# Patient Record
Sex: FEMALE | Race: BLACK OR AFRICAN AMERICAN | Employment: UNEMPLOYED | URBAN - METROPOLITAN AREA
[De-identification: names, ages, dates, MRNs, and addresses within clinical notes are randomized per-mention and may not be internally consistent; named-entity substitution may affect disease eponyms.]

---

## 2017-03-03 ENCOUNTER — OP HISTORICAL/CONVERTED ENCOUNTER (OUTPATIENT)
Dept: OTHER | Age: 62
End: 2017-03-03

## 2017-06-12 ENCOUNTER — OFFICE VISIT (OUTPATIENT)
Dept: ENDOCRINOLOGY | Age: 62
End: 2017-06-12

## 2017-06-12 ENCOUNTER — OP HISTORICAL/CONVERTED ENCOUNTER (OUTPATIENT)
Dept: OTHER | Age: 62
End: 2017-06-12

## 2017-06-12 VITALS
DIASTOLIC BLOOD PRESSURE: 79 MMHG | HEART RATE: 77 BPM | BODY MASS INDEX: 27.78 KG/M2 | HEIGHT: 67 IN | OXYGEN SATURATION: 97 % | TEMPERATURE: 98.1 F | SYSTOLIC BLOOD PRESSURE: 130 MMHG | WEIGHT: 177 LBS | RESPIRATION RATE: 20 BRPM

## 2017-06-12 DIAGNOSIS — E04.2 MULTINODULAR GOITER: Primary | ICD-10-CM

## 2017-06-12 NOTE — MR AVS SNAPSHOT
Visit Information Date & Time Provider Department Dept. Phone Encounter #  
 6/12/2017  1:00 PM Maryan Vincent MD [facility] Diabetes & Endocrinology [phone] [id number] Follow-up Instructions Return in about 6 months (around 12/12/2017). Upcoming Health Maintenance Date Due Hepatitis C Screening 1955 DTaP/Tdap/Td series (1 - Tdap) 4/22/1978 PAP AKA CERVICAL CYTOLOGY 4/22/1978 BREAST CANCER SCRN MAMMOGRAM 4/22/2007 FOBT Q 1 YEAR AGE 50-75 4/22/2007 ZOSTER VACCINE AGE 60> 4/22/2017 INFLUENZA AGE 9 TO ADULT 8/1/2017 Allergies as of 6/12/2017  Review Complete On: 6/12/2017 By: Maryan Vincent MD  
 Not on File Current Immunizations  Never Reviewed No immunizations on file. Not reviewed this visit You Were Diagnosed With   
  
 Codes Comments Multinodular goiter    -  Primary ICD-10-CM: E04.2 ICD-9-CM: 554. 1 Vitals BP Pulse Temp Resp Height(growth percentile) Weight(growth percentile) 130/79 77 98.1 °F (36.7 °C) (Oral) 20 5' 7\" (1.702 m) 177 lb (80.3 kg) SpO2 BMI OB Status Smoking Status 97% 27.72 kg/m2 Unknown Never Smoker BMI and BSA Data Body Mass Index Body Surface Area  
 27.72 kg/m 2 1.95 m 2 Your Updated Medication List  
  
   
This list is accurate as of: 6/12/17  1:26 PM.  Always use your most recent med list.  
  
  
  
  
 ATACAND 16 mg tablet Generic drug:  candesartan Take  by mouth daily. calcium 500 mg Tab Take  by mouth. Follow-up Instructions Return in about 6 months (around 12/12/2017). To-Do List   
 10/02/2017 Imaging:  US THYROID/PARATHYROID/SOFT TISS   
  
 11/12/2017 Lab:  T4, FREE   
  
 11/12/2017 Lab:  TSH 3RD GENERATION Introducing [facility]! Eduar Antunez introduces [employer] patient portal. Now you can access parts of your medical record, email your doctor's office, and request medication refills online. 1. In your internet browser, go to [email or web address]://[employer]. [employer]/[place] 2. Click on the First Time User? Click Here link in the Sign In box. You will see the New Member Sign Up page. 3. Enter your [employer] Access Code exactly as it appears below. You will not need to use this code after youve completed the sign-up process. If you do not sign up before the expiration date, you must request a new code. · [employer] Access Code: [email or web address] Expires: 9/10/2017  1:21 PM 
 
4. Enter the last four digits of your Social Security Number (xxxx) and Date of Birth (mm/dd/yyyy) as indicated and click Submit. You will be taken to the next sign-up page. 5. Create a Teepixt ID. This will be your [employer] login ID and cannot be changed, so think of one that is secure and easy to remember. 6. Create a [employer] password. You can change your password at any time. 7. Enter your Password Reset Question and Answer. This can be used at a later time if you forget your password. 8. Enter your e-mail address. You will receive e-mail notification when new information is available in [address] 9. Click Sign Up. You can now view and download portions of your medical record. 10. Click the Download Summary menu link to download a portable copy of your medical information. If you have questions, please visit the Frequently Asked Questions section of the [employer] website. Remember, [employer] is NOT to be used for urgent needs. For medical emergencies, dial 911. Now available from your iPhone and Android! Please provide this summary of care documentation to your next provider. Your primary care clinician is listed as [facility]. If you have any questions after today's visit, please call [phone].

## 2017-06-12 NOTE — PROGRESS NOTES
Wt Readings from Last 3 Encounters:   06/12/17 177 lb (80.3 kg)   12/12/16 186 lb (84.4 kg)   05/03/16 181 lb (82.1 kg)     Temp Readings from Last 3 Encounters:   06/12/17 98.1 °F (36.7 °C) (Oral)   12/12/16 97.6 °F (36.4 °C) (Oral)   05/03/16 97 °F (36.1 °C) (Oral)     BP Readings from Last 3 Encounters:   06/12/17 130/79   12/12/16 142/80   05/03/16 131/70     Pulse Readings from Last 3 Encounters:   06/12/17 77   12/12/16 83   05/03/16 77     Lab Results   Component Value Date/Time    Hemoglobin A1c, External 5.7 09/08/2015     Lab Results   Component Value Date/Time    TSH 0.335 12/28/2015 08:39 AM    T4, Free 0.98 12/28/2015 08:39 AM

## 2017-06-12 NOTE — PROGRESS NOTES
HISTORY OF PRESENT ILLNESS  Criss Harris is a 63 y.o. female. HPI  F/u after last  visit for thyroid disease from Dec 2016     Lost 9  lbs   She had done TLC     She is asymptomatic   She has NO chokiness         Prior history :   Referred for evaluation of Low TSH     No family history of thyroid disease   Denies any hyper or hypothyroid symptoms        Bilateral breast cancer - s/p  Lumpectomy 2008 and 2014   No anti-estrogen  hormonal treatments followed up   F/u with Dr. Bill Cody   Has alopecia and lost libido  She had hysterectomy and TLSO 2005       Past Medical History:   Diagnosis Date    Bilateral breast cancer ([address] 2014, 2008    lumpectomy    HTN (hypertension)        Social History     Social History    Marital status:      Spouse name: N/A    Number of children: N/A    Years of education: N/A     Occupational History    Not on file. Social History Main Topics    Smoking status: Never Smoker    Smokeless tobacco: Not on file    Alcohol use No    Drug use: No    Sexual activity: Not on file     Other Topics Concern    Not on file     Social History Narrative       Family History   Problem Relation Age of Onset    Cancer Mother     Stroke Maternal Uncle     Heart Disease Maternal Grandmother              Review of Systems   Constitutional: Negative. HENT: Negative. Eyes: Negative for pain and redness. Respiratory: Negative. Cardiovascular: Negative for chest pain, palpitations and leg swelling. Gastrointestinal: Negative. Negative for constipation. Genitourinary: Negative. Musculoskeletal: Negative for myalgias. Skin: Negative. Neurological: Negative. Endo/Heme/Allergies: Negative. Psychiatric/Behavioral: Negative for depression and memory loss. The patient does not have insomnia. Physical Exam   Constitutional: She is oriented to person, place, and time. She appears well-developed and well-nourished. HENT:   Head: Normocephalic.    Eyes: Conjunctivae and EOM are normal. Pupils are equal, round, and reactive to light. Neck: Normal range of motion. Neck supple. No JVD present. No tracheal deviation present. Thyromegaly (2-3 times enlarged) present. Cardiovascular: Normal rate, regular rhythm and normal heart sounds. No murmur heard. Pulmonary/Chest: Breath sounds normal.   Abdominal: Soft. Bowel sounds are normal.   Musculoskeletal: Normal range of motion. Lymphadenopathy:     She has no cervical adenopathy. Neurological: She is alert and oriented to person, place, and time. She has normal reflexes. Skin: Skin is warm. Psychiatric: She has a normal mood and affect.          Labs   TSH is   0.352   From    June 5 2017   TSh is 0.3 from dec 2015   And from oct 2016   In oct 2015 , was 0.2        ASSESSMENT and PLAN    Low TSH  :  MNG, toxic  Or from warm  thyroid nodule   explained to pt that she does not require treatment now, but there is a chance that this can get worse   She needs 6 monthly TFTs    Palpable goiter :  usg thyroid  From oct 2015   A nodule arising from right thyroid lobe extending to midline of 3.6 cm in size   This is a warm nodule - not active enough to use NICOLE therapy or give anti-thyroid drugs   S/p FNA in feb 2016 : both sites negative for cancer   F/u usg can be oct   2017     Bilateral breast cancer - s/p  Lumpectomy 2008 and 2014   She ended chemo radiation 2015   No anti-estrogen  hormonal treatments followed up   She has alopecia and less libido from being post menopausal     > 50 % of time is spent on counseling

## 2017-10-04 ENCOUNTER — HOSPITAL ENCOUNTER (OUTPATIENT)
Dept: ULTRASOUND IMAGING | Age: 62
Discharge: HOME | End: 2017-10-04
Attending: INTERNAL MEDICINE
Payer: OTHER GOVERNMENT

## 2017-10-04 DIAGNOSIS — E04.2 MULTINODULAR GOITER: ICD-10-CM

## 2017-10-04 PROCEDURE — 76536 US EXAM OF HEAD AND NECK: CPT

## 2017-12-04 ENCOUNTER — OFFICE VISIT (OUTPATIENT)
Dept: ENDOCRINOLOGY | Age: 62
End: 2017-12-04

## 2017-12-04 VITALS
HEIGHT: 67 IN | DIASTOLIC BLOOD PRESSURE: 90 MMHG | TEMPERATURE: 96.1 F | RESPIRATION RATE: 16 BRPM | SYSTOLIC BLOOD PRESSURE: 152 MMHG | BODY MASS INDEX: 27.69 KG/M2 | OXYGEN SATURATION: 95 % | HEART RATE: 78 BPM | WEIGHT: 176.4 LBS

## 2017-12-04 DIAGNOSIS — E04.2 MULTINODULAR GOITER: Primary | ICD-10-CM

## 2017-12-04 NOTE — PROGRESS NOTES
HISTORY OF PRESENT ILLNESS  Mojgan Ortiz is a 63 y.o. female. HPI  F/u after last  visit for thyroid disease from June 12 2017     Lost  ONE  lb   She had done TLC     She is asymptomatic   Had labs and usg   She has NO chokiness         Prior history :   Referred for evaluation of Low TSH     No family history of thyroid disease   Denies any hyper or hypothyroid symptoms        Bilateral breast cancer - s/p  Lumpectomy 2008 and 2014   No anti-estrogen  hormonal treatments followed up   F/u with Dr. Sonal Olson   Has alopecia and lost libido  She had hysterectomy and TLSO 2005       Past Medical History:   Diagnosis Date    Bilateral breast cancer ([facility] [address] 2014, 2008    lumpectomy    HTN (hypertension)        Social History     Social History    Marital status:      Spouse name: N/A    Number of children: N/A    Years of education: N/A     Occupational History    Not on file. Social History Main Topics    Smoking status: Never Smoker    Smokeless tobacco: Never Used    Alcohol use No    Drug use: No    Sexual activity: Not on file     Other Topics Concern    Not on file     Social History Narrative       Family History   Problem Relation Age of Onset    Cancer Mother     Stroke Maternal Uncle     Heart Disease Maternal Grandmother              Review of Systems   Constitutional: Negative. HENT: Negative. Eyes: Negative for pain and redness. Respiratory: Negative. Cardiovascular: Negative for chest pain, palpitations and leg swelling. Gastrointestinal: Negative. Negative for constipation. Genitourinary: Negative. Musculoskeletal: Negative for myalgias. Skin: Negative. Neurological: Negative. Endo/Heme/Allergies: Negative. Psychiatric/Behavioral: Negative for depression and memory loss. The patient does not have insomnia. Physical Exam   Constitutional: She is oriented to person, place, and time. She appears well-developed and well-nourished.    HENT:   Head: Normocephalic. Eyes: Conjunctivae and EOM are normal. Pupils are equal, round, and reactive to light. Neck: Normal range of motion. Neck supple. No JVD present. No tracheal deviation present. Thyromegaly (2-3 times enlarged) present. Cardiovascular: Normal rate, regular rhythm and normal heart sounds. No murmur heard. Pulmonary/Chest: Breath sounds normal.   Abdominal: Soft. Bowel sounds are normal.   Musculoskeletal: Normal range of motion. Lymphadenopathy:     She has no cervical adenopathy. Neurological: She is alert and oriented to person, place, and time. She has normal reflexes. Skin: Skin is warm. Psychiatric: She has a normal mood and affect. Labs :    Skipper [place]ocket is normal from nov 2017  TSH is   0.352   From    June 5 2017   TSh is 0.3 from dec 2015   And from oct 2016   In oct 2015 , was 0.2        ASSESSMENT and PLAN    Low TSH  :  MNG, toxic  Or from warm  thyroid nodule   explained to pt that she does not require treatment now, but there is a chance that this can get worse   She needs 6 monthly TFTs    Palpable goiter :  usg thyroid  From oct 2015   A nodule arising from right thyroid lobe extending to midline of 3.6 cm in size  ( could be wrongly dictated by the radiologist - will confirm with him)   This is a warm nodule - not active enough to use NICOLE therapy or give anti-thyroid drugs   S/p FNA in feb 2016 : both sites ( Isthmic and left lobe )  negative for cancer   USG  oct   2017 : There is a dominant 3.7 x 2.6 x 2.0 cm mixed solid/cystic nodule at the lower pole of the left thyroid lobe, unchanged in size when compared to the prior study. Multiple smaller nodules are also seen, including a 7 mm hypoechoic nodule in the right  isthmus, a 1.5 cm mixed solid/cystic nodule in the midportion of the left lobe, and several subcentimeter hypoechoic left lobe nodules.     She is euthyroid and asymptomatic    Bilateral breast cancer - s/p  Lumpectomy 2008 and 2014   She ended chemo radiation 2015   No anti-estrogen  hormonal treatments followed up   She has alopecia and less libido from being post menopausal       F/u in a year with labs bnv    > 50 % of time is spent on counseling   Patient voiced understanding her plan of care

## 2017-12-04 NOTE — PROGRESS NOTES
Chief Complaint   Patient presents with    Thyroid Problem     1. Have you been to the ER, urgent care clinic since your last visit? Hospitalized since your last visit? No    2. Have you seen or consulted any other health care providers outside of the [address] [place] [address] since your last visit? Include any pap smears or colon screening.  No     Wt Readings from Last 3 Encounters:   12/04/17 176 lb 6.4 oz (80 kg)   06/12/17 177 lb (80.3 kg)   12/12/16 186 lb (84.4 kg)     Temp Readings from Last 3 Encounters:   12/04/17 96.1 °F (35.6 °C) (Oral)   06/12/17 98.1 °F (36.7 °C) (Oral)   12/12/16 97.6 °F (36.4 °C) (Oral)     BP Readings from Last 3 Encounters:   12/04/17 152/90   06/12/17 130/79   12/12/16 142/80     Pulse Readings from Last 3 Encounters:   12/04/17 78   06/12/17 77   12/12/16 83

## 2017-12-04 NOTE — MR AVS SNAPSHOT
Visit Information Date & Time Provider Department Dept. Phone Encounter #  
 12/4/2017 10:30 AM Reggie Kay MD [facility] [phone] [id number] Follow-up Instructions Return in about 1 year (around 12/4/2018). Upcoming Health Maintenance Date Due Hepatitis C Screening 1955 DTaP/Tdap/Td series (1 - Tdap) 4/22/1978 PAP AKA CERVICAL CYTOLOGY 4/22/1978 BREAST CANCER SCRN MAMMOGRAM 4/22/2007 FOBT Q 1 YEAR AGE 50-75 4/22/2007 ZOSTER VACCINE AGE 60> 2/22/2017 Influenza Age 5 to Adult 8/1/2017 Allergies as of 12/4/2017  Review Complete On: 12/4/2017 By: Reggie Kay MD  
 No Known Allergies Current Immunizations  Never Reviewed No immunizations on file. Not reviewed this visit You Were Diagnosed With   
  
 Codes Comments Multinodular goiter    -  Primary ICD-10-CM: E04.2 ICD-9-CM: 564. 1 Vitals BP Pulse Temp Resp Height(growth percentile) Weight(growth percentile) 152/90 (BP 1 Location: Left arm, BP Patient Position: Sitting) 78 96.1 °F (35.6 °C) (Oral) 16 5' 7\" (1.702 m) 176 lb 6.4 oz (80 kg) SpO2 BMI OB Status Smoking Status 95% 27.63 kg/m2 Unknown Never Smoker BMI and BSA Data Body Mass Index Body Surface Area  
 27.63 kg/m 2 1.94 m 2 Preferred Pharmacy Pharmacy Name Phone [facility] [address] [phone] Your Updated Medication List  
  
   
This list is accurate as of: 12/4/17 11:01 AM.  Always use your most recent med list.  
  
  
  
  
 ATACAND 16 mg tablet Generic drug:  candesartan Take  by mouth daily. calcium 500 mg Tab Take  by mouth. Follow-up Instructions Return in about 1 year (around 12/4/2018). Please provide this summary of care documentation to your next provider. Your primary care clinician is listed as Rhoder[facility] [place].  If you have any questions after today's visit, please call [phone].

## 2017-12-05 ENCOUNTER — TELEPHONE (OUTPATIENT)
Dept: ENDOCRINOLOGY | Age: 62
End: 2017-12-05

## 2017-12-05 NOTE — TELEPHONE ENCOUNTER
Informed patient lab results were normal per . Patient verbalized understanding. No further questions noted at this time.

## 2017-12-17 ENCOUNTER — DOCUMENTATION ONLY (OUTPATIENT)
Dept: ENDOCRINOLOGY | Age: 62
End: 2017-12-17

## 2018-03-05 ENCOUNTER — OP HISTORICAL/CONVERTED ENCOUNTER (OUTPATIENT)
Dept: OTHER | Age: 63
End: 2018-03-05

## 2018-04-16 ENCOUNTER — OP HISTORICAL/CONVERTED ENCOUNTER (OUTPATIENT)
Dept: OTHER | Age: 63
End: 2018-04-16

## 2018-11-27 LAB
T4 FREE SERPL-MCNC: 1.15 NG/DL (ref 0.82–1.77)
TSH SERPL DL<=0.005 MIU/L-ACNC: 0.2 UIU/ML (ref 0.45–4.5)

## 2018-12-03 ENCOUNTER — OFFICE VISIT (OUTPATIENT)
Dept: ENDOCRINOLOGY | Age: 63
End: 2018-12-03

## 2018-12-03 VITALS
WEIGHT: 184 LBS | DIASTOLIC BLOOD PRESSURE: 97 MMHG | RESPIRATION RATE: 16 BRPM | SYSTOLIC BLOOD PRESSURE: 171 MMHG | HEART RATE: 73 BPM | BODY MASS INDEX: 28.88 KG/M2 | HEIGHT: 67 IN | OXYGEN SATURATION: 96 %

## 2018-12-03 DIAGNOSIS — R79.89 LOW TSH LEVEL: ICD-10-CM

## 2018-12-03 DIAGNOSIS — I10 ESSENTIAL HYPERTENSION: ICD-10-CM

## 2018-12-03 DIAGNOSIS — E05.20 TOXIC NODULAR GOITER: Primary | ICD-10-CM

## 2018-12-03 NOTE — PROGRESS NOTES
HISTORY OF PRESENT ILLNESS  Elisabet Garcia is a 66 y.o. female. HPI  F/u after last  visit for thyroid disease from December 4 2017     Lost  ONE  lb   She is asymptomatic           Prior history :   Referred for evaluation of Low TSH     No family history of thyroid disease   Denies any hyper or hypothyroid symptoms        Bilateral breast cancer - s/p  Lumpectomy 2008 and 2014   No anti-estrogen  hormonal treatments followed up   F/u with Dr. Edward Borden   Has alopecia and lost libido  She had hysterectomy and TLSO 2005       Past Medical History:   Diagnosis Date    Bilateral breast cancer ([facility] [address] 2014, 2008    lumpectomy    HTN (hypertension)        Social History     Socioeconomic History    Marital status:      Spouse name: Not on file    Number of children: Not on file    Years of education: Not on file    Highest education level: Not on file   Social Needs    Financial resource strain: Not on file    Food insecurity - worry: Not on file    Food insecurity - inability: Not on file   [employer] needs - medical: Not on file   [employer] needs - non-medical: Not on file   Occupational History    Not on file   Tobacco Use    Smoking status: Never Smoker    Smokeless tobacco: Never Used   Substance and Sexual Activity    Alcohol use: No     Alcohol/week: 0.0 oz    Drug use: No    Sexual activity: Not on file   Other Topics Concern    Not on file   Social History Narrative    Not on file       Family History   Problem Relation Age of Onset    Cancer Mother     Stroke Maternal Uncle     Heart Disease Maternal Grandmother              Review of Systems   Constitutional: Negative. HENT: Negative. Eyes: Negative for pain and redness. Respiratory: Negative. Cardiovascular: Negative for chest pain, palpitations and leg swelling. Gastrointestinal: Negative. Negative for constipation. Genitourinary: Negative. Musculoskeletal: Negative for myalgias. Skin: Negative. Neurological: Negative. Endo/Heme/Allergies: Negative. Psychiatric/Behavioral: Negative for depression and memory loss. The patient does not have insomnia. Physical Exam   Constitutional: She is oriented to person, place, and time. She appears well-developed and well-nourished. HENT:   Head: Normocephalic. Eyes: Conjunctivae and EOM are normal. Pupils are equal, round, and reactive to light. Neck: Normal range of motion. Neck supple. No JVD present. No tracheal deviation present. Thyromegaly (2-3 times enlarged) present. Cardiovascular: Normal rate, regular rhythm and normal heart sounds. No murmur heard. Pulmonary/Chest: Breath sounds normal.   Abdominal: Soft. Bowel sounds are normal.   Musculoskeletal: Normal range of motion. Lymphadenopathy:     She has no cervical adenopathy. Neurological: She is alert and oriented to person, place, and time. She has normal reflexes. Skin: Skin is warm. Psychiatric: She has a normal mood and affect. Labs :  Lab Results   Component Value Date/Time    TSH 0.199 (L) 11/26/2018 11:43 AM    T4, Free 1.15 11/26/2018 11:43 AM      TSh is normal from nov 2017  TSH is   0.352   From    June 5 2017   TSh is 0.3 from dec 2015   And from oct 2016   In oct 2015 , was 0.2        ASSESSMENT and PLAN    Low TSH  :  MNG, toxic  Or from warm  thyroid nodule  explained to pt that she does not require treatment now, but there is a chance that this can get worse   This is a warm nodule - not active enough to use NICOLE therapy or give anti-thyroid drugs       Palpable goiter :  usg thyroid  From oct 2015   A nodule arising from right thyroid lobe extending to midline of 3.6 cm in size  ( could be wrongly dictated by the radiologist - will confirm with him)   This is a warm nodule - not active enough to use NICOLE therapy or give anti-thyroid drugs   S/p FNA in feb 2016 : both sites ( Isthmic and left lobe )  negative for cancer   USG  oct   2017 :   There is a dominant 3.7 x 2.6 x 2.0 cm mixed solid/cystic nodule at the lower pole of the left thyroid lobe, unchanged in size when compared to the prior study. Multiple smaller nodules are also seen, including a 7 mm hypoechoic nodule in the right  isthmus, a 1.5 cm mixed solid/cystic nodule in the midportion of the left lobe, and several subcentimeter hypoechoic left lobe nodules.   She is euthyroid and asymptomatic        HTN :  Very uncontrolled   atacand  16 mg a day   F/u with pcp       Bilateral breast cancer - s/p  Lumpectomy 2008 and 2014   She ended chemo radiation 2015   No anti-estrogen  hormonal treatments followed up   She has alopecia and less libido from being post menopausal       F/u in a year with labs bnv    > 50 % of time is spent on counseling   Patient voiced understanding her plan of care

## 2018-12-03 NOTE — PROGRESS NOTES
Conrado Stanley is a 66 y.o. female      Chief Complaint   Patient presents with    Follow-up    Thyroid Problem       1. Have you been to the ER, urgent care clinic since your last visit? Hospitalized since your last visit? No    2. Have you seen or consulted any other health care providers outside of the [address] [place] [address] since your last visit? Include any pap smears or colon screening.  No     Wt Readings from Last 3 Encounters:   12/03/18 184 lb (83.5 kg)   12/04/17 176 lb 6.4 oz (80 kg)   06/12/17 177 lb (80.3 kg)     Temp Readings from Last 3 Encounters:   12/04/17 96.1 °F (35.6 °C) (Oral)   06/12/17 98.1 °F (36.7 °C) (Oral)   12/12/16 97.6 °F (36.4 °C) (Oral)     BP Readings from Last 3 Encounters:   12/03/18 (!) 171/97   12/04/17 152/90   06/12/17 130/79     Pulse Readings from Last 3 Encounters:   12/03/18 73   12/04/17 78   06/12/17 77

## 2019-03-15 ENCOUNTER — OP HISTORICAL/CONVERTED ENCOUNTER (OUTPATIENT)
Dept: OTHER | Age: 64
End: 2019-03-15

## 2019-03-22 ENCOUNTER — OP HISTORICAL/CONVERTED ENCOUNTER (OUTPATIENT)
Dept: OTHER | Age: 64
End: 2019-03-22

## 2019-09-10 ENCOUNTER — TELEPHONE (OUTPATIENT)
Dept: ENDOCRINOLOGY | Age: 64
End: 2019-09-10

## 2019-09-10 NOTE — TELEPHONE ENCOUNTER
----- Message from Kasie Abbott sent at 9/9/2019  5:36 PM EDT -----  Regarding: Estela Funk/telephone  Pt has a question in reference to a bill that she has received but said she has paid.  Best contact number is [phone]

## 2019-11-20 ENCOUNTER — HOSPITAL ENCOUNTER (OUTPATIENT)
Dept: LAB | Age: 64
Discharge: HOME | End: 2019-11-20

## 2019-11-20 ENCOUNTER — LAB ONLY (OUTPATIENT)
Dept: ENDOCRINOLOGY | Age: 64
End: 2019-11-20

## 2019-11-20 DIAGNOSIS — R79.89 LOW TSH LEVEL: ICD-10-CM

## 2019-11-20 DIAGNOSIS — E05.20 TOXIC NODULAR GOITER: ICD-10-CM

## 2019-11-20 DIAGNOSIS — E05.20 TOXIC NODULAR GOITER: Primary | ICD-10-CM

## 2019-11-21 LAB
T4 FREE SERPL-MCNC: 0.9 NG/DL (ref 0.8–1.5)
TSH SERPL DL<=0.05 MIU/L-ACNC: 0.19 UIU/ML (ref 0.36–3.74)

## 2019-12-04 ENCOUNTER — OFFICE VISIT (OUTPATIENT)
Dept: ENDOCRINOLOGY | Age: 64
End: 2019-12-04

## 2019-12-04 VITALS
HEART RATE: 82 BPM | WEIGHT: 187.6 LBS | HEIGHT: 67 IN | TEMPERATURE: 96.9 F | OXYGEN SATURATION: 98 % | BODY MASS INDEX: 29.44 KG/M2 | RESPIRATION RATE: 18 BRPM | SYSTOLIC BLOOD PRESSURE: 134 MMHG | DIASTOLIC BLOOD PRESSURE: 75 MMHG

## 2019-12-04 DIAGNOSIS — E05.20 TOXIC NODULAR GOITER: ICD-10-CM

## 2019-12-04 DIAGNOSIS — R79.89 LOW TSH LEVEL: Primary | ICD-10-CM

## 2019-12-04 RX ORDER — CANDESARTAN CILEXETIL AND HYDROCHLOROTHIAZIDE 16; 12.5 MG/1; MG/1
1 TABLET ORAL DAILY
COMMUNITY
Start: 2019-09-17

## 2019-12-04 NOTE — PROGRESS NOTES
1. Have you been to the ER, urgent care clinic since your last visit? No  Hospitalized since your last visit? No    2. Have you seen or consulted any other health care providers outside of the [address] [place] [address] since your last visit? Include any pap smears or colon screening.  No    Wt Readings from Last 3 Encounters:   12/04/19 187 lb 9.6 oz (85.1 kg)   12/03/18 184 lb (83.5 kg)   12/04/17 176 lb 6.4 oz (80 kg)     Temp Readings from Last 3 Encounters:   12/04/19 96.9 °F (36.1 °C) (Oral)   12/04/17 96.1 °F (35.6 °C) (Oral)   06/12/17 98.1 °F (36.7 °C) (Oral)     BP Readings from Last 3 Encounters:   12/04/19 134/75   12/03/18 (!) 171/97   12/04/17 152/90     Pulse Readings from Last 3 Encounters:   12/04/19 82   12/03/18 73   12/04/17 78

## 2019-12-04 NOTE — PROGRESS NOTES
HISTORY OF PRESENT ILLNESS  Edson Crandall is a 60 y.o. female.   HPI  Yearly follow up  after last  visit for low TSH and MNG  from December 4 2017     Lost  ONE  lb   She is asymptomatic   Denies any hyperthyroid symptoms           Prior history :   Referred for evaluation of Low TSH     No family history of thyroid disease   Denies any hyper or hypothyroid symptoms        Bilateral breast cancer - s/p  Lumpectomy 2008 and 2014   No anti-estrogen  hormonal treatments followed up   F/u with Dr. Meghana Ruth   Has alopecia and lost libido  She had hysterectomy and TLSO 2005       Past Medical History:   Diagnosis Date    Bilateral breast cancer ([facility] [address] 2014, 2008    lumpectomy    HTN (hypertension)        Social History     Socioeconomic History    Marital status:      Spouse name: Not on file    Number of children: Not on file    Years of education: Not on file    Highest education level: Not on file   Occupational History    Not on file   Social Needs    Financial resource strain: Not on file    Food insecurity:     Worry: Not on file     Inability: Not on file    Transportation needs:     Medical: Not on file     Non-medical: Not on file   Tobacco Use    Smoking status: Never Smoker    Smokeless tobacco: Never Used   Substance and Sexual Activity    Alcohol use: No     Alcohol/week: 0.0 standard drinks    Drug use: No    Sexual activity: Not on file   Lifestyle    Physical activity:     Days per week: Not on file     Minutes per session: Not on file    Stress: Not on file   Relationships    Social connections:     Talks on phone: Not on file     Gets together: Not on file     Attends Congregational service: Not on file     Active member of club or organization: Not on file     Attends meetings of clubs or organizations: Not on file     Relationship status: Not on file    Intimate partner violence:     Fear of current or ex partner: Not on file     Emotionally abused: Not on file     Physically abused: Not on file     Forced sexual activity: Not on file   Other Topics Concern    Not on file   Social History Narrative    Not on file       Family History   Problem Relation Age of Onset    Cancer Mother     Stroke Maternal Uncle     Heart Disease Maternal Grandmother              Review of Systems   Constitutional: Negative. HENT: Negative. Eyes: Negative for pain and redness. Respiratory: Negative. Cardiovascular: Negative for chest pain, palpitations and leg swelling. Gastrointestinal: Negative. Negative for constipation. Genitourinary: Negative. Musculoskeletal: Negative for myalgias. Skin: Negative. Neurological: Negative. Endo/Heme/Allergies: Negative. Psychiatric/Behavioral: Negative for depression and memory loss. The patient does not have insomnia. Physical Exam   Constitutional: She is oriented to person, place, and time. She appears well-developed and well-nourished. HENT:   Head: Normocephalic. Eyes: Conjunctivae and EOM are normal. Pupils are equal, round, and reactive to light. Neck: Normal range of motion. Neck supple. No JVD present. No tracheal deviation present. Thyromegaly (2-3 times enlarged) present. Cardiovascular: Normal rate, regular rhythm and normal heart sounds. No murmur heard. Pulmonary/Chest: Breath sounds normal.   Abdominal: Soft. Bowel sounds are normal.   Musculoskeletal: Normal range of motion. Lymphadenopathy:     She has no cervical adenopathy. Neurological: She is alert and oriented to person, place, and time. She has normal reflexes. Skin: Skin is warm. Psychiatric: She has a normal mood and affect.          Labs :  Lab Results   Component Value Date/Time    TSH 0.19 (L) 11/20/2019 09:26 AM    T4, Free 0.9 11/20/2019 09:26 AM      TSh is normal from nov 2017  TSH is   0.352   From    June 5 2017   TSh is 0.3 from dec 2015   And from oct 2016   In oct 2015 , was 0.2        ASSESSMENT and PLAN    Low TSH  :  MNG, toxic  Or from warm  thyroid nodule  explained to pt that she does not require treatment now, but there is a chance that this can get worse   This is a warm nodule - not active enough to use NICOLE therapy or give anti-thyroid drugs       Palpable goiter :    usg thyroid  From oct 2015   A nodule arising from right thyroid lobe extending to midline of 3.6 cm in size  ( could be wrongly dictated by the radiologist - will confirm with him)   This is a warm nodule - not active enough to use NICOLE therapy or give anti-thyroid drugs   S/p FNA in feb 2016 : both sites ( Isthmic and left lobe )  negative for cancer       USG  oct   2017 : There is a dominant 3.7 x 2.6 x 2.0 cm mixed solid/cystic nodule at the lower pole of the left thyroid lobe, unchanged in size when compared to the prior study. Multiple smaller nodules are also seen, including a 7 mm hypoechoic nodule in the right isthmus, a 1.5 cm mixed solid/cystic nodule in the midportion of the left lobe, and several subcentimeter hypoechoic left lobe nodules.   She is euthyroid and asymptomatic  Ordering usg and thyroid scan as her recent TSH is 0.1        HTN :  Better controlled   atacand- hctz   16-12.5 mg a day       Bilateral breast cancer - s/p  Lumpectomy 2008 and 2014   She ended chemo radiation 2015   No anti-estrogen  hormonal treatments followed up   She has alopecia and less libido from being post menopausal       F/u in 6 months with usg and scan   > 50 % of time is spent on counseling   Patient voiced understanding her plan of care

## 2019-12-04 NOTE — LETTER
12/4/19 Patient: Aleah Melendez YOB: 1955 Date of Visit: 12/4/2019 Sarah Beth Rubi MD 
[place] [address] [address] [facility] [address] VIA Facsimile: [phone] Dear Sarah Beth Rubi MD, Thank you for referring Ms. Taylor Milian to [address] [place] [address] for evaluation. My notes for this consultation are attached. If you have questions, please do not hesitate to call me. I look forward to following your patient along with you. Sincerely, Esvin Gupta MD

## 2020-01-08 ENCOUNTER — HOSPITAL ENCOUNTER (OUTPATIENT)
Dept: NUCLEAR MEDICINE | Age: 65
Discharge: HOME | End: 2020-01-08
Attending: INTERNAL MEDICINE
Payer: OTHER GOVERNMENT

## 2020-01-08 DIAGNOSIS — E05.20 TOXIC NODULAR GOITER: ICD-10-CM

## 2020-01-08 DIAGNOSIS — R79.89 LOW TSH LEVEL: ICD-10-CM

## 2020-01-08 PROCEDURE — 78014 THYROID IMAGING W/BLOOD FLOW: CPT

## 2020-01-09 ENCOUNTER — HOSPITAL ENCOUNTER (OUTPATIENT)
Dept: ULTRASOUND IMAGING | Age: 65
Discharge: HOME | End: 2020-01-09
Attending: INTERNAL MEDICINE
Payer: OTHER GOVERNMENT

## 2020-01-09 ENCOUNTER — HOSPITAL ENCOUNTER (OUTPATIENT)
Dept: NUCLEAR MEDICINE | Age: 65
Discharge: HOME | End: 2020-01-09
Attending: INTERNAL MEDICINE
Payer: OTHER GOVERNMENT

## 2020-01-09 DIAGNOSIS — R79.89 LOW TSH LEVEL: ICD-10-CM

## 2020-01-09 DIAGNOSIS — E05.20 TOXIC NODULAR GOITER: ICD-10-CM

## 2020-01-09 PROCEDURE — 76536 US EXAM OF HEAD AND NECK: CPT

## 2020-02-21 ENCOUNTER — OP HISTORICAL/CONVERTED ENCOUNTER (OUTPATIENT)
Dept: OTHER | Age: 65
End: 2020-02-21

## 2020-03-16 ENCOUNTER — OP HISTORICAL/CONVERTED ENCOUNTER (OUTPATIENT)
Dept: OTHER | Age: 65
End: 2020-03-16

## 2020-04-21 ENCOUNTER — TELEPHONE (OUTPATIENT)
Dept: ENDOCRINOLOGY | Age: 65
End: 2020-04-21

## 2020-04-21 DIAGNOSIS — E04.2 MULTINODULAR GOITER: Primary | ICD-10-CM

## 2020-04-21 NOTE — TELEPHONE ENCOUNTER
Order placed for pt per verbal order with read back from Dr. Walter West 04/21/20    Lab slips mailed

## 2020-04-29 LAB — CREATININE, EXTERNAL: 0.77

## 2020-05-29 LAB
T4 FREE SERPL-MCNC: 1.08 NG/DL (ref 0.82–1.77)
TSH SERPL DL<=0.005 MIU/L-ACNC: 0.23 UIU/ML (ref 0.45–4.5)

## 2020-06-04 ENCOUNTER — VIRTUAL VISIT (OUTPATIENT)
Dept: ENDOCRINOLOGY | Age: 65
End: 2020-06-04

## 2020-06-04 DIAGNOSIS — C50.912 BILATERAL MALIGNANT NEOPLASM OF BREAST IN FEMALE, UNSPECIFIED ESTROGEN RECEPTOR STATUS, UNSPECIFIED SITE OF BREAST (HCC): ICD-10-CM

## 2020-06-04 DIAGNOSIS — E05.20 TOXIC NODULAR GOITER: Primary | ICD-10-CM

## 2020-06-04 DIAGNOSIS — E04.2 MULTIPLE THYROID NODULES: ICD-10-CM

## 2020-06-04 DIAGNOSIS — C50.911 BILATERAL MALIGNANT NEOPLASM OF BREAST IN FEMALE, UNSPECIFIED ESTROGEN RECEPTOR STATUS, UNSPECIFIED SITE OF BREAST (HCC): ICD-10-CM

## 2020-06-04 NOTE — PROGRESS NOTES
1. Have you been to the ER, urgent care clinic since your last visit? No Hospitalized since your last visit? No    2. Have you seen or consulted any other health care providers outside of the [address] [place] [address] since your last visit? Include any pap smears or colon screening.  No

## 2020-06-04 NOTE — PROGRESS NOTES
**THIS IS A VIRTUAL VISIT VIA AUDIO- VIDEO SYNCHRONOUS DISCUSSION. PATIENT AGREED TO HAVE THEIR CARE DELIVERED OVER A [employer] ME VIDEO VISIT IN PLACE OF THEIR REGULARLY SCHEDULED OFFICE VISIT**   Pt  is aware that this is a billable encounter and is responsible for copays/deductibles   Patient gave a verbal consent to proceed with virtual video visit   Patient is at home and I, the provider,  am at the office care diabetes and endocrinology      HISTORY OF PRESENT ILLNESS  Fidelia Umaña is a 63 y.o. female.   HPI  Yearly follow up  after last  visit for low TSH and MNG  from December 4 2019    She is c/o fluctuation in her mood, weight and she is thinking thyroid is the cause for it   She has no other symtptoms      Old history      Lost  ONE  lb   She is asymptomatic   Denies any hyperthyroid symptoms           Prior history :   Referred for evaluation of Low TSH     No family history of thyroid disease   Denies any hyper or hypothyroid symptoms        Bilateral breast cancer - s/p  Lumpectomy 2008 and 2014   No anti-estrogen  hormonal treatments followed up   F/u with Dr. Ioana Rodriguez   Has alopecia and lost libido  She had hysterectomy and TLSO 2005       Past Medical History:   Diagnosis Date    Bilateral breast cancer ([facility] [address] 2014, 2008    lumpectomy    HTN (hypertension)        Social History     Socioeconomic History    Marital status:      Spouse name: Not on file    Number of children: Not on file    Years of education: Not on file    Highest education level: Not on file   Occupational History    Not on file   Social Needs    Financial resource strain: Not on file    Food insecurity     Worry: Not on file     Inability: Not on file    Transportation needs     Medical: Not on file     Non-medical: Not on file   Tobacco Use    Smoking status: Never Smoker    Smokeless tobacco: Never Used   Substance and Sexual Activity    Alcohol use: No     Alcohol/week: 0.0 standard drinks    Drug use: No    Sexual activity: Not on file   Lifestyle    Physical activity     Days per week: Not on file     Minutes per session: Not on file    Stress: Not on file   Relationships    Social connections     Talks on phone: Not on file     Gets together: Not on file     Attends Mormonism service: Not on file     Active member of club or organization: Not on file     Attends meetings of clubs or organizations: Not on file     Relationship status: Not on file    Intimate partner violence     Fear of current or ex partner: Not on file     Emotionally abused: Not on file     Physically abused: Not on file     Forced sexual activity: Not on file   Other Topics Concern    Not on file   Social History Narrative    Not on file       Family History   Problem Relation Age of Onset    Cancer Mother     Stroke Maternal Uncle     Heart Disease Maternal Grandmother                        Review of Systems   Constitutional: Negative. HENT: Negative. Eyes: Negative for pain and redness. Respiratory: Negative. Cardiovascular: Negative for chest pain, palpitations and leg swelling. Gastrointestinal: Negative. Negative for constipation. Genitourinary: Negative. Musculoskeletal: Negative for myalgias. Skin: Negative. Neurological: Negative. Endo/Heme/Allergies: Negative. Psychiatric/Behavioral: Negative for depression and memory loss. The patient does not have insomnia. Physical Exam   Constitutional: oriented to person, place, and time. appears well-developed and well-nourished. HENT:   Head: Normocephalic. Eyes: normal , noted no swelling or redness. Neck: Normal range of motion. Cardiovascular: could nto be examined  Pulmonary/Chest: appears breathing effortlessly  Abdominal: Soft. Musculoskeletal: appears relatively nprmal  range of motion. Neurological: He is alert and oriented to person, place, and time.  Appears to have no focal deficits    Psychiatric: He has a normal mood and affect. Labs :  Lab Results   Component Value Date/Time    TSH 0.225 (L) 05/28/2020 10:06 AM    T4, Free 1.08 05/28/2020 10:06 AM      TSh is normal from nov 2017  TSH is   0.352   From    June 5 2017   TSh is 0.3 from dec 2015   And from oct 2016   In oct 2015 , was 0.2        ASSESSMENT and PLAN    Low TSH  :  Her TSH has been the same for 2.5 years  0.2   MNG, toxic  Or from warm  thyroid nodule  explained to pt that she does not require treatment now, but there is a chance that this can get worse   This is a warm nodule - not active enough to use NICOLE therapy or give anti-thyroid drugs       Palpable goiter :    usg thyroid  From oct 2015   A nodule arising from right thyroid lobe extending to midline of 3.6 cm in size  ( could be wrongly dictated by the radiologist - will confirm with him)   This is a warm nodule - not active enough to use NICOLE therapy or give anti-thyroid drugs   S/p FNA in feb 2016 : both sites ( Isthmic and left lobe )  negative for cancer       USG  oct   2017 : There is a dominant 3.7 x 2.6 x 2.0 cm mixed solid/cystic nodule at the lower pole of the left thyroid lobe, unchanged in size when compared to the prior study. Multiple smaller nodules are also seen, including a 7 mm hypoechoic nodule in the right isthmus, a 1.5 cm mixed solid/cystic nodule in the midportion of the left lobe, and several subcentimeter hypoechoic left lobe nodules.   She is euthyroid and asymptomatic  Ordered usg and thyroid scan   In dec 2019   as her recent TSH is 0.1:  Found to have warm nodule on left side mid polar area   usg revealed right lobe bigger than left lobe   Right side has larger nodule of more than 3 cm size   Left side there are 2 nodules - of 1.5 cm and 1.5 cm size      FNA pending  -  Asymptomatic  And  Euthyroid   usg with bilateral biospy possibly in 6 months         HTN :  Better controlled   atacand- hctz   16-12.5 mg a day       Bilateral breast cancer - s/p  Lumpectomy 2008 and 2014   She ended chemo radiation 2015   No anti-estrogen  hormonal treatments followed up   She has alopecia and less libido from being post menopausal       F/u in 6 months  For usg and bilat biopsy     Pursuant  To the Emergency declaration under the [employer] and the [facility], [facility] waiver authority and the [facility], to reduce the patient's risk of exposure to  COVID-19 and provide continuity of care for an established patient.

## 2020-10-02 ENCOUNTER — TRANSCRIBE ORDER (OUTPATIENT)
Dept: SCHEDULING | Age: 65
End: 2020-10-02

## 2020-10-02 DIAGNOSIS — C50.911 MALIGNANT NEOPLASM OF RIGHT BREAST (HCC): Primary | ICD-10-CM

## 2020-10-07 ENCOUNTER — HOSPITAL ENCOUNTER (OUTPATIENT)
Dept: CT IMAGING | Age: 65
Discharge: HOME | End: 2020-10-07
Attending: INTERNAL MEDICINE
Payer: OTHER GOVERNMENT

## 2020-10-07 DIAGNOSIS — C50.911 MALIGNANT NEOPLASM OF RIGHT BREAST (HCC): ICD-10-CM

## 2020-10-07 PROCEDURE — 74011000636 HC RX REV CODE- 636: Performed by: INTERNAL MEDICINE

## 2020-10-07 PROCEDURE — 71260 CT THORAX DX C+: CPT

## 2020-10-07 RX ADMIN — IOPAMIDOL 100 ML: 755 INJECTION, SOLUTION INTRAVENOUS at 10:15

## 2020-11-18 ENCOUNTER — OFFICE VISIT (OUTPATIENT)
Dept: ENDOCRINOLOGY | Age: 65
End: 2020-11-18
Payer: OTHER GOVERNMENT

## 2020-11-18 VITALS
HEART RATE: 79 BPM | OXYGEN SATURATION: 99 % | HEIGHT: 67 IN | TEMPERATURE: 97.5 F | BODY MASS INDEX: 28.56 KG/M2 | SYSTOLIC BLOOD PRESSURE: 136 MMHG | DIASTOLIC BLOOD PRESSURE: 70 MMHG | RESPIRATION RATE: 16 BRPM | WEIGHT: 182 LBS

## 2020-11-18 DIAGNOSIS — E05.20 TOXIC NODULAR GOITER: Primary | ICD-10-CM

## 2020-11-18 DIAGNOSIS — I10 ESSENTIAL HYPERTENSION: ICD-10-CM

## 2020-11-18 DIAGNOSIS — E04.2 MULTINODULAR GOITER: ICD-10-CM

## 2020-11-18 PROCEDURE — 99214 OFFICE O/P EST MOD 30 MIN: CPT | Performed by: INTERNAL MEDICINE

## 2020-11-18 PROCEDURE — 10005 FNA BX W/US GDN 1ST LES: CPT | Performed by: INTERNAL MEDICINE

## 2020-11-18 RX ORDER — METHIMAZOLE 5 MG/1
TABLET ORAL
Qty: 45 TAB | Refills: 3 | Status: SHIPPED | OUTPATIENT
Start: 2020-11-18 | End: 2021-05-18 | Stop reason: SDUPTHER

## 2020-11-18 NOTE — PATIENT INSTRUCTIONS
Please take tylenol 500 mg or Motrin 400 mg (2 pills of 200 mg dose) OTC,  if there is any biopsy site pain You can go back to your normal routine immediately If you notice any dime sized redness, at the biopsy site, it is normal and do not worry If you have more symptoms and signs requiring emergency assistance,  call 911   or go to Emergency room  
 
--------------------------------------------------------------------------- Tapazole 5 mg every other day

## 2020-11-18 NOTE — LETTER
11/19/20 Patient: Boogie Pratt YOB: 1955 Date of Visit: 11/18/2020 Jane Crandall MD 
[place] [address] [address] VIA Facsimile: [phone] Cleveland Browne NP 
[place] [address] [address] VIA Facsimile: [phone] Dear MD Cleveland Purcell NP, Thank you for referring Ms. Valentino Bari to [address] [place] [address] for evaluation. My notes for this consultation are attached. If you have questions, please do not hesitate to call me. I look forward to following your patient along with you. Sincerely, Lonny Lopez MD

## 2020-11-18 NOTE — PROGRESS NOTES
HISTORY OF PRESENT ILLNESS  Tisha Ribeiro is a 63 y.o. female.   HPI  6 monthly   follow up  after last  visit for low TSH and MNG  from  June video 2020     Not  Prepared for FNA, even though scheduled for FNA bilateral in THE  office   Pandemic postponement  Has made her forget  - not sure     Denies any new symptoms in necl   She had labs done       June Video 2020     She is c/o fluctuation in her mood, weight and she is thinking thyroid is the cause for it   She has no other symtptoms      Old history      Lost  ONE  lb   She is asymptomatic   Denies any hyperthyroid symptoms           Prior history :   Referred for evaluation of Low TSH     No family history of thyroid disease   Denies any hyper or hypothyroid symptoms        Bilateral breast cancer - s/p  Lumpectomy 2008 and 2014   No anti-estrogen  hormonal treatments followed up   F/u with Dr. De Leon People   Has alopecia and lost libido  She had hysterectomy and TLSO 2005       Past Medical History:   Diagnosis Date    Bilateral breast cancer ([facility] [address] 2014, 2008    lumpectomy    HTN (hypertension)        Social History     Socioeconomic History    Marital status:      Spouse name: Not on file    Number of children: Not on file    Years of education: Not on file    Highest education level: Not on file   Occupational History    Not on file   Social Needs    Financial resource strain: Not on file    Food insecurity     Worry: Not on file     Inability: Not on file    Transportation needs     Medical: Not on file     Non-medical: Not on file   Tobacco Use    Smoking status: Never Smoker    Smokeless tobacco: Never Used   Substance and Sexual Activity    Alcohol use: No     Alcohol/week: 0.0 standard drinks    Drug use: No    Sexual activity: Not on file   Lifestyle    Physical activity     Days per week: Not on file     Minutes per session: Not on file    Stress: Not on file   Relationships    Social connections     Talks on phone: Not on file     Gets together: Not on file     Attends Hoahaoism service: Not on file     Active member of club or organization: Not on file     Attends meetings of clubs or organizations: Not on file     Relationship status: Not on file    Intimate partner violence     Fear of current or ex partner: Not on file     Emotionally abused: Not on file     Physically abused: Not on file     Forced sexual activity: Not on file   Other Topics Concern    Not on file   Social History Narrative    Not on file       Family History   Problem Relation Age of Onset    Cancer Mother     Stroke Maternal Uncle     Heart Disease Maternal Grandmother        Review of Systems   Constitutional: Negative. HENT: Negative. Eyes: Negative for pain and redness. Respiratory: Negative. Cardiovascular: has palpitations   Gastrointestinal: Negative. Negative for constipation. Genitourinary: Negative. Musculoskeletal: Negative for myalgias. Skin: Negative. Neurological: Negative. Endo/Heme/Allergies: Negative. Psychiatric/Behavioral: Negative for depression and memory loss. The patient does not have insomnia. Physical Exam   Constitutional: oriented to person, place, and time. appears well-developed and well-nourished. HENT:   Head: Normocephalic. Eyes: normal , noted no swelling or redness. Neck: Normal range of motion. Thyromegaly, right side nodule FELT HARD   Cardiovascular: could nto be examined  Pulmonary/Chest: appears breathing effortlessly  Abdominal: Soft. Musculoskeletal: appears relatively nprmal  range of motion. Neurological: He is alert and oriented to person, place, and time. Appears to have no focal deficits    Psychiatric: He has a normal mood and affect.            Labs :  Lab Results   Component Value Date/Time    TSH 0.14 (L) 11/11/2020 01:36 PM    T4, Free 1.0 11/11/2020 01:36 PM      TSh is normal from nov 2017  TSH is   0.352   From    June 5 2017   TSh is 0.3 from dec 2015 And from oct 2016   In oct 2015 , was 0.2        ASSESSMENT and PLAN    1. Low TSH  :  Her TSH has been the same for 2.5 years  AT   0.2   MNG, toxic  Or from warm  thyroid nodule    NOV 2020  - tsh IS 0.11  GIVEN HER PALPITATIONS- STARTING ON TAPAZOLE 5 MG every other day         2. MULTINODULAR  goiter :   WITH WARM NODULE ON LEFT SIDE    Nov 2020 - as left lobe nodule has shrunk in size- not considering FNA   Right side nodule- large in size - FNA today in office     May video  VISIT   FNA pOSTPONED BECAUSE OF THE PANDEMIC     -  Asymptomatic  And  Euthyroid   usg with bilateral biospy possibly in 6 months     Jan 2020 :   usg  - Found to have warm nodule on left side mid polar area   usg revealed right lobe bigger than left lobe   Right side has larger nodule of more than 3 cm size   Left side there are 2 nodules - of 1.5 cm and 1.5 cm size  ( the lower nodule on left pole measured almost double and it has shrunk in size )    Jan 2020  : thyroid Scan - overall  Has 10 % uptake value and warm nodule on left lobe          usg thyroid  From oct 2015   A nodule arising from right thyroid lobe extending to midline of 3.6 cm in size  ( could be wrongly dictated by the radiologist - will confirm with him)   This is a warm nodule - not active enough to use NICOLE therapy or give anti-thyroid drugs   S/p FNA in feb 2016 : both sites ( Isthmic and left lobe )  negative for cancer   Nov 2020  : I want to do FNA on right side hard nodule       USG  oct   2017 : There is a dominant 3.7 x 2.6 x 2.0 cm mixed solid/cystic nodule at the lower pole of the left thyroid lobe, unchanged in size when compared to the prior study. Multiple smaller nodules are also seen, including a 7 mm hypoechoic nodule in the right isthmus, a 1.5 cm mixed solid/cystic nodule in the midportion of the left lobe, and several subcentimeter hypoechoic left lobe nodules. She is euthyroid and asymptomatic   recent TSH is 0.1        2.  HTN :  Better controlled   atacand- hctz   16-12.5 mg a day       3.   Bilateral breast cancer - s/p  Lumpectomy 2008 and 2014   She ended chemo radiation 2015   No anti-estrogen  hormonal treatments followed up   She has alopecia and less libido from being post menopausal       F/u in 6 months      > 50 % of time is spent on counseling   Patient voiced understanding her plan of care

## 2020-11-18 NOTE — PROGRESS NOTES
Tolu Ghosh is a 63 y.o. female here for   Chief Complaint   Patient presents with    Thyroid Problem    Biopsy       1. Have you been to the ER, urgent care clinic since your last visit? Hospitalized since your last visit? -no    2. Have you seen or consulted any other health care providers outside of the [address] [place] [address] since your last visit?   Include any pap smears or colon screening.-no

## 2020-11-18 NOTE — PROGRESS NOTES
[facility] DIABETES & ENDOCRINOLOGY  OFFICE PROCEDURE PROGRESS NOTE        Chart reviewed for the following:   Vern Gomez MD, have reviewed the History, Physical and updated the Allergic reactions for Janet GARD[facility] [address] performed immediately prior to start of procedure:   Vern Gomez MD, have performed the following reviews on Trisha jm Willingham prior to the start of the procedure:            * Patient was identified by name and date of birth   * Agreement on procedure being performed was verified  * Risks and Benefits explained to the patient  * Procedure site verified and marked as necessary  * Patient was positioned for comfort  * Consent was signed and verified     Time: 1.15 pm     Pain scale : 0    Date of procedure: 11/18/2020    Procedure performed by:  Amarjit Hoover MD    Provider assisted by: Rolanda Mccartney LPN        Real time imaging was performed in both transverse and sagittal planes    Nodule size was : Over 3  Cm  HARD NODULE   Following informed consent, a procedural pause was held to confirm patiient identity and site of biopsy. After sterile preparation, FNA guidance was performed using direct ultrasound guidance to confirm accurate needle placement. 5 aspirations were made using 25  G needles  Samples were submitted to cytology  Patient  tolerated procedure well without complications  After care instructions provided.       Pain scale post procedure : 2

## 2020-11-25 ENCOUNTER — DOCUMENTATION ONLY (OUTPATIENT)
Dept: ENDOCRINOLOGY | Age: 65
End: 2020-11-25

## 2020-11-25 NOTE — PROGRESS NOTES
Reviewed the path report from date 18 November 2020 from [facility]    Right side nodule from the thyroid is nondiagnostic inform patient that we were expecting this because the nodule was pretty hard    I will keep following the nodule by ultrasound or by CT scan in future       Uriel Monge MD

## 2021-03-25 ENCOUNTER — TRANSCRIBE ORDER (OUTPATIENT)
Dept: SCHEDULING | Age: 66
End: 2021-03-25

## 2021-03-25 DIAGNOSIS — Z12.31 SCREENING MAMMOGRAM FOR HIGH-RISK PATIENT: Primary | ICD-10-CM

## 2021-03-30 ENCOUNTER — HOSPITAL ENCOUNTER (OUTPATIENT)
Dept: MAMMOGRAPHY | Age: 66
Discharge: HOME | End: 2021-03-30
Attending: INTERNAL MEDICINE
Payer: OTHER GOVERNMENT

## 2021-03-30 DIAGNOSIS — Z12.31 SCREENING MAMMOGRAM FOR HIGH-RISK PATIENT: ICD-10-CM

## 2021-03-30 PROCEDURE — 77063 BREAST TOMOSYNTHESIS BI: CPT

## 2021-05-12 ENCOUNTER — TELEPHONE (OUTPATIENT)
Dept: ENDOCRINOLOGY | Age: 66
End: 2021-05-12

## 2021-05-12 DIAGNOSIS — R79.89 LOW TSH LEVEL: ICD-10-CM

## 2021-05-12 DIAGNOSIS — E04.2 MULTINODULAR GOITER: ICD-10-CM

## 2021-05-12 DIAGNOSIS — E05.20 TOXIC NODULAR GOITER: Primary | ICD-10-CM

## 2021-05-18 ENCOUNTER — OFFICE VISIT (OUTPATIENT)
Dept: ENDOCRINOLOGY | Age: 66
End: 2021-05-18
Payer: OTHER GOVERNMENT

## 2021-05-18 VITALS
OXYGEN SATURATION: 98 % | WEIGHT: 181.4 LBS | RESPIRATION RATE: 16 BRPM | DIASTOLIC BLOOD PRESSURE: 75 MMHG | HEIGHT: 67 IN | SYSTOLIC BLOOD PRESSURE: 136 MMHG | BODY MASS INDEX: 28.47 KG/M2 | HEART RATE: 74 BPM

## 2021-05-18 DIAGNOSIS — E04.2 MULTINODULAR GOITER: ICD-10-CM

## 2021-05-18 DIAGNOSIS — E05.20 TOXIC NODULAR GOITER: Primary | ICD-10-CM

## 2021-05-18 PROCEDURE — 99214 OFFICE O/P EST MOD 30 MIN: CPT | Performed by: INTERNAL MEDICINE

## 2021-05-18 RX ORDER — METHIMAZOLE 5 MG/1
TABLET ORAL
Qty: 45 TAB | Refills: 3 | Status: SHIPPED | OUTPATIENT
Start: 2021-05-18 | End: 2021-11-17 | Stop reason: SDUPTHER

## 2021-05-18 NOTE — PATIENT INSTRUCTIONS
SPECIFIC INSTRUCTIONS BELOW  
 
-------------------------------------------------------------------------------------------- Refills    -    please call your pharmacy and have them send us a refill request 
 
 tapazole 5 mg every other day PAY ATTENTION TO THESE GENERAL INSTRUCTIONS  
 
-ANY tests other than blood work, which you opt to do  outside [facility] facilities, you are responsible for prior authorizations if  required  
- [address] [facility] AVS- PLEASE IGNORE  
- YOUR MED LIST IS NOT UP TO DATE AS SOME CHANGES ARE BEING MADE AFTER THE VISIT - [address] Results *Normal results will not be notified by a phone call starting January 1 2021 *If you have an upcoming visit, the results will be discussed at the visit *Please sign up for MY CHART if you want access to your lab and test results *Abnormal results which require immediate attention will be notified by phone call *Abnormal results which do not require immediate assistance will be notified in 1-2 weeks Refills    -    have your pharmacy send us a refill request 
Phone calls  -  Allow  24 hrs. for non-urgent calls to be returned Prior authorization - It may take 2-4 weeks to process Forms  -  FMLA, DMV etc., will take up to 2 weeks to process Cancellations - please notify the office 2 days in advance Samples  - will only be dispensed at visits  
 
--------------------------------------------------------------------------------------------

## 2021-05-18 NOTE — PROGRESS NOTES
Room:     Identified pt with two pt identifiers(name and ). Reviewed record in preparation for visit and have obtained necessary documentation. All patient medications has been reviewed. Chief Complaint   Patient presents with    Thyroid Problem     Toxic nodular goiter        Health Maintenance Due   Topic    Hepatitis C Screening     DTaP/Tdap/Td series (1 - Tdap)    PAP AKA CERVICAL CYTOLOGY     Shingrix Vaccine Age 49> (1 of 2)    Colorectal Cancer Screening Combo     Lipid Screen        Vitals:    21 0851   BP: 136/75   Pulse: 74   Resp: 16   SpO2: 98%   Weight: 181 lb 6.4 oz (82.3 kg)   Height: 5' 7\" (1.702 m)   PainSc:   0 - No pain       4. Have you been to the ER, urgent care clinic since your last visit? Hospitalized since your last visit? No    5. Have you seen or consulted any other health care providers outside of the [address] [place] [address] since your last visit? Include any pap smears or colon screening. No        Patient is accompanied by self I have received verbal consent from Steffi Bryant to discuss any/all medical information while they are present in the room.

## 2021-05-18 NOTE — LETTER
5/19/2021 Patient: Lucina Powell YOB: 1955 Date of Visit: 5/18/2021 Jim Hines MD 
[facility] [address] [facility] [address] Via Fax: [phone] [facility] [address] [facility] [address] Fax: [phone] Dear MD Rosas Dickson. JAMESON Gautam, Thank you for referring Ms. Justice Mccarty to [address] [place] [address] for evaluation. My notes for this consultation are attached. If you have questions, please do not hesitate to call me. I look forward to following your patient along with you. Sincerely, Sunny Raymond MD

## 2021-05-18 NOTE — PROGRESS NOTES
HISTORY OF PRESENT ILLNESS  Sanjeev Christianson is a 61 y.o. female. HPI  6 monthly   follow up  after last  visit for low TSH and MNG  from  Nov 2020       Started on Tapazole 5 mg every other day             Nov 2020     Not  Prepared for FNA, even though scheduled for FNA bilateral in THE  office   Pandemic postponement  Has made her forget  - not sure   Denies any new symptoms in necl   She had labs done       June Video 2020     She is c/o fluctuation in her mood, weight and she is thinking thyroid is the cause for it   She has no other symtptoms        Prior history :   Referred for evaluation of Low TSH     No family history of thyroid disease   Denies any hyper or hypothyroid symptoms   Bilateral breast cancer - s/p  Lumpectomy 2008 and 2014   No anti-estrogen  hormonal treatments followed up   F/u with Dr. Huitron Matter   Has alopecia and lost libido  She had hysterectomy and TLSO 2005         Review of Systems   Constitutional: Negative. Cardiovascular: has palpitations   Gastrointestinal: Negative. Negative for constipation. Psychiatric/Behavioral: Negative for depression and memory loss. The patient does not have insomnia. Physical Exam   Constitutional: oriented to person, place, and time. appears well-developed and well-nourished. Neck: Normal range of motion. Thyromegaly, right side nodule FELT HARD   Psychiatric: He has a normal mood and affect. Labs :  Lab Results   Component Value Date/Time    TSH 1.44 05/12/2021 09:55 AM    T4, Free 0.9 05/12/2021 09:55 AM      TSh is normal from nov 2017  TSH is   0.352   From    June 5 2017   TSh is 0.3 from dec 2015   And from oct 2016   In oct 2015 , was 0.2        ASSESSMENT and PLAN    1.  Low TSH  :  Her TSH has been the same for 2.5 years  AT   0.2   MNG, toxic  Or from warm  thyroid nodule    NOV 2020  - tsh IS 0.11  GIVEN HER PALPITATIONS- STARTING ON TAPAZOLE 5 MG every other day     May 2021  : EUTHYROID  ON TAPAZOLE 5 MG every other day         2. MULTINODULAR  goiter :   WITH WARM NODULE ON LEFT SIDE  MAY 2021 -  LEFT SIDE  - NOT PERFORMED THE FNA  AS IT SEEMED TO HAVE SHRUNK IN SIZE PLUS IT WAS THE WARM NODULE   RIGHT SIDE S/P FNA - NEGATIVE FOR CANCER   Asked her to keep thinking about the left side FNA , and to let me know when she is ready for it     Nov 2020 - as left lobe nodule has shrunk in size- not considering FNA   Right side nodule- large in size - FNA today in office     May video  VISIT   FNA pOSTPONED BECAUSE OF THE PANDEMIC     -  Asymptomatic  And  Euthyroid   usg with bilateral biospy possibly in 6 months     Jan 2020 :   usg  - Found to have warm nodule on left side mid polar area   usg revealed right lobe bigger than left lobe   Right side has larger nodule of more than 3 cm size   Left side there are 2 nodules - of 1.5 cm and 1.5 cm size  ( the lower nodule on left pole measured almost double IN 2016 US[facility]  and it has shrunk in size )    Jan 2020  : thyroid Scan - overall  Has 10 % uptake value and warm nodule on left lobe          usg thyroid  From oct 2015   A nodule arising from right thyroid lobe extending to midline of 3.6 cm in size  ( could be wrongly dictated by the radiologist - will confirm with him)   This is a warm nodule - not active enough to use NICOLE therapy or give anti-thyroid drugs   S/p FNA in feb 2016 : both sites ( Isthmic and left lobe )  negative for cancer   Nov 2020  : I want to do FNA on right side hard nodule       [facility]  oct   2017 : There is a dominant 3.7 x 2.6 x 2.0 cm mixed solid/cystic nodule at the lower pole of the left thyroid lobe, unchanged in size when compared to the prior study. Multiple smaller nodules are also seen, including a 7 mm hypoechoic nodule in the right isthmus, a 1.5 cm mixed solid/cystic nodule in the midportion of the left lobe, and several subcentimeter hypoechoic left lobe nodules. She is euthyroid and asymptomatic   recent TSH is 0.1        2.  HTN :  Better controlled   atacand- hctz   16-12.5 mg a day       3.   Bilateral breast cancer - s/p  Lumpectomy 2008 and 2014   She ended chemo radiation 2015   No anti-estrogen  hormonal treatments followed up   She has alopecia and less libido from being post menopausal       F/u in 6 months      > 50 % of time is spent on counseling   Patient voiced understanding her plan of care

## 2021-10-05 ENCOUNTER — TRANSCRIBE ORDER (OUTPATIENT)
Dept: SCHEDULING | Age: 66
End: 2021-10-05

## 2021-10-05 DIAGNOSIS — C50.311 MALIGNANT NEOPLASM OF LOWER-INNER QUADRANT OF RIGHT FEMALE BREAST (HCC): Primary | ICD-10-CM

## 2021-10-05 DIAGNOSIS — C34.90 LUNG CANCER (HCC): ICD-10-CM

## 2021-11-01 ENCOUNTER — HOSPITAL ENCOUNTER (OUTPATIENT)
Dept: CT IMAGING | Age: 66
Discharge: HOME | End: 2021-11-01
Attending: INTERNAL MEDICINE
Payer: OTHER GOVERNMENT

## 2021-11-01 DIAGNOSIS — C50.311 MALIGNANT NEOPLASM OF LOWER-INNER QUADRANT OF RIGHT FEMALE BREAST (HCC): ICD-10-CM

## 2021-11-01 DIAGNOSIS — C34.90 LUNG CANCER (HCC): ICD-10-CM

## 2021-11-01 PROCEDURE — 74011000636 HC RX REV CODE- 636: Performed by: INTERNAL MEDICINE

## 2021-11-01 PROCEDURE — 71260 CT THORAX DX C+: CPT

## 2021-11-01 RX ADMIN — IOPAMIDOL 100 ML: 755 INJECTION, SOLUTION INTRAVENOUS at 14:59

## 2021-11-17 RX ORDER — METHIMAZOLE 5 MG/1
TABLET ORAL
Qty: 45 TABLET | Refills: 3 | Status: SHIPPED | OUTPATIENT
Start: 2021-11-17 | End: 2022-01-18 | Stop reason: SDUPTHER

## 2021-11-17 NOTE — TELEPHONE ENCOUNTER
----- Message from Mohsen Spring sent at 11/17/2021 10:16 AM EST -----  Regarding: Dr. Varun Concepcion  Medication Refill    Caller (if not patient):      Relationship of caller (if not patient): Self      Best contact number(s): [phone]      Name of medication and dosage if known: methIMAzole (TAPAZOLE) 5 mg tablet [[id number]      Is patient out of this medication (yes/no): 3 pills lefts      Pharmacy name: [facility] [address] listed in chart? (yes/no): Yes  Pharmacy phone number: Phone:  [phone]  Fax:  [phone]       Details to clarify the request: Please refill the prescription today if possible.        [employer]

## 2022-01-12 LAB — TSH SERPL DL<=0.005 MIU/L-ACNC: 1.13 UIU/ML (ref 0.45–4.5)

## 2022-01-18 ENCOUNTER — OFFICE VISIT (OUTPATIENT)
Dept: ENDOCRINOLOGY | Age: 67
End: 2022-01-18
Payer: OTHER GOVERNMENT

## 2022-01-18 VITALS
OXYGEN SATURATION: 92 % | BODY MASS INDEX: 29.03 KG/M2 | HEIGHT: 67 IN | HEART RATE: 84 BPM | WEIGHT: 185 LBS | RESPIRATION RATE: 18 BRPM | SYSTOLIC BLOOD PRESSURE: 135 MMHG | TEMPERATURE: 98.6 F | DIASTOLIC BLOOD PRESSURE: 72 MMHG

## 2022-01-18 DIAGNOSIS — E04.2 MULTINODULAR GOITER: Primary | ICD-10-CM

## 2022-01-18 DIAGNOSIS — R00.2 PALPITATIONS: ICD-10-CM

## 2022-01-18 DIAGNOSIS — C50.911 BILATERAL MALIGNANT NEOPLASM OF BREAST IN FEMALE, UNSPECIFIED ESTROGEN RECEPTOR STATUS, UNSPECIFIED SITE OF BREAST (HCC): ICD-10-CM

## 2022-01-18 DIAGNOSIS — C50.912 BILATERAL MALIGNANT NEOPLASM OF BREAST IN FEMALE, UNSPECIFIED ESTROGEN RECEPTOR STATUS, UNSPECIFIED SITE OF BREAST (HCC): ICD-10-CM

## 2022-01-18 PROCEDURE — 99214 OFFICE O/P EST MOD 30 MIN: CPT | Performed by: INTERNAL MEDICINE

## 2022-01-18 RX ORDER — METHIMAZOLE 5 MG/1
TABLET ORAL
Qty: 45 TABLET | Refills: 3 | Status: SHIPPED | OUTPATIENT
Start: 2022-01-18 | End: 2022-07-20 | Stop reason: SDUPTHER

## 2022-01-18 NOTE — PROGRESS NOTES
HISTORY OF PRESENT ILLNESS  Alen Glover is a 61 y.o. female. HPI  6 monthly   follow up  after last  visit for low TSH and MNG  from  May 2021       Gained 5 lbs   C/o palpitations   She denies any compressive symptoms         MAy 2021     Started on Tapazole 5 mg every other day       Prior history :   Referred for evaluation of Low TSH   No family history of thyroid disease   Denies any hyper or hypothyroid symptoms   Bilateral breast cancer - s/p  Lumpectomy 2008 and 2014   No anti-estrogen  hormonal treatments followed up   F/u with Dr. Aubree Jackson   Has alopecia and lost libido  She had hysterectomy and TLSO 2005         Review of Systems   Constitutional: Negative. Cardiovascular: has palpitations   Gastrointestinal: Negative. Negative for constipation. Psychiatric/Behavioral: Negative for depression and memory loss. The patient does not have insomnia. Physical Exam   Constitutional: oriented to person, place, and time. appears well-developed and well-nourished. Neck: Normal range of motion. Thyromegaly, right side nodule FELT HARD   Psychiatric: He has a normal mood and affect. Labs :  Lab Results   Component Value Date/Time    TSH 1.130 01/11/2022 12:00 AM    T4, Free 0.9 05/12/2021 09:55 AM      TSh is normal from nov 2017  TSH is   0.352   From    June 5 2017   TSh is 0.3 from dec 2015   And from oct 2016   In oct 2015 , was 0.2        ASSESSMENT and PLAN    1. Low TSH  :  Her TSH has been the same for 2.5 years  AT   0.2   MNG, toxic  Or from warm  thyroid nodule . For benefit of doubt as she started noticing palpitation, initiated on tapazole low dose , despite which she continues to have palpitations   Jan 2022   : EUTHYROID  ON TAPAZOLE 5 MG every other day       2.   MULTINODULAR  goiter :   WITH WARM NODULE ON LEFT SIDE    Ordered follow up usg this year 2022  Along with CT neck with and without contrast   Will decide on repeat FNA on right side       MAY 2021 -  LEFT SIDE  - NOT PERFORMED THE FNA  AS IT SEEMED TO HAVE SHRUNK IN SIZE PLUS IT WAS THE WARM NODULE ;    Reviewed the path report from date 18 November 2020 from [address] [place] [address], it was non diagnostic         Jan 2020  : thyroid Scan - overall  Has 10 % uptake value and warm nodule on left lobe   S/p FNA in feb 2016 : both sites ( Isthmic and left lobe )  negative for cancer     2. HTN :  Better controlled  On atacand- hctz   16-12.5 mg a day       3. Bilateral breast cancer - s/p  Lumpectomy 2008 and 2014   She ended chemo radiation 2015 ; No anti-estrogen  hormonal treatments followed up   She has alopecia and less libido from being post menopausal         4. Palpitations  -   Follows up   With         5.  H/o adenocarcinoma of  Lung  - resected       F/u in 6 months        Reviewed results with patient and discussed the labs being ordered today/bnv  Patient voiced understanding of plan of care

## 2022-01-18 NOTE — PATIENT INSTRUCTIONS
SPECIFIC INSTRUCTIONS BELOW      tapazole 5 mg every other day           -------------PAY ATTENTION TO THESE GENERAL INSTRUCTIONS -----------------      - The medications prescribed at this visit will not be available at pharmacy until 6 pm       - YOUR MED LIST IS NOT UP TO DATE AS SOME CHANGES ARE BEING MADE AFTER THE VISIT - FOLLOW SPECIFIC INSTRUCTIONS  ABOVE     -ANY tests other than blood work, which you opt to do  outside the  [facility] facilities, you are responsible for prior authorizations if  required    - [address] [facility]- PLEASE IGNORE     Results     *Normal results will not be notified by a phone call starting January 1 2021   *If you have an upcoming visit, the results will be discussed at the visit   *Please sign up for MY CHART if you want access to your lab and test results  *Abnormal results which require immediate attention will be notified by phone call   *Abnormal results which do not require immediate assistance will be notified in 1-2 weeks       Refills    -    have your pharmacy send us a refill request . Refills are done max for one year and a visit is a must before refills are extended    Follow up appointments -  highly encourage you to make it when you are checking out. We can accommodate you into the schedule based on your clinical situation, but not for extending refills beyond a year. Labs are important to give refills and is important to get labs before the visit     Phone calls  -  Allow  24 hrs.  for non-urgent calls to be returned  Prior authorization - It may take 2-4 weeks to process  Forms  -  FMLA, DMV etc., will take up to 2 weeks to process  Cancellations - please notify the office 2 days in advance   Samples  - will only be dispensed at visits       If not showing for the appointments and cancelling appointments within 24 hours are kept track of and three  of such situations in  two consecutive years will likely be considered for termination from the practice    -------------------------------------------------------------------------------------------------------------------

## 2022-01-18 NOTE — LETTER
1/18/2022    Patient: Yulissa Rodriguez   YOB: 1955   Date of Visit: 1/18/2022     Noelle Ray PA-C  [address] [facility] [address]  Via Fax: [phone]    Dear Noelle Gautam PA-C,      Thank you for referring Ms. Richar Deleon to [address] [place] [address] for evaluation. My notes for this consultation are attached. If you have questions, please do not hesitate to call me. I look forward to following your patient along with you.       Sincerely,    Claire Tariq MD

## 2022-01-19 ENCOUNTER — TRANSCRIBE ORDER (OUTPATIENT)
Dept: SCHEDULING | Age: 67
End: 2022-01-19

## 2022-01-19 DIAGNOSIS — Z12.31 SCREENING MAMMOGRAM, ENCOUNTER FOR: Primary | ICD-10-CM

## 2022-03-19 PROBLEM — E04.2 MULTINODULAR GOITER: Status: ACTIVE | Noted: 2017-12-04

## 2022-03-31 ENCOUNTER — HOSPITAL ENCOUNTER (OUTPATIENT)
Dept: MAMMOGRAPHY | Age: 67
Discharge: HOME | End: 2022-03-31
Attending: INTERNAL MEDICINE
Payer: OTHER GOVERNMENT

## 2022-03-31 DIAGNOSIS — Z12.31 SCREENING MAMMOGRAM, ENCOUNTER FOR: ICD-10-CM

## 2022-03-31 PROCEDURE — 77063 BREAST TOMOSYNTHESIS BI: CPT

## 2022-04-11 ENCOUNTER — TRANSCRIBE ORDER (OUTPATIENT)
Dept: SCHEDULING | Age: 67
End: 2022-04-11

## 2022-04-11 DIAGNOSIS — R92.8 ABNORMAL MAMMOGRAM: Primary | ICD-10-CM

## 2022-04-21 ENCOUNTER — HOSPITAL ENCOUNTER (OUTPATIENT)
Dept: MAMMOGRAPHY | Age: 67
Discharge: HOME | End: 2022-04-21
Attending: INTERNAL MEDICINE
Payer: MEDICARE

## 2022-04-21 DIAGNOSIS — R92.8 ABNORMAL MAMMOGRAM: ICD-10-CM

## 2022-04-21 PROCEDURE — 77066 DX MAMMO INCL CAD BI: CPT

## 2022-04-22 ENCOUNTER — TRANSCRIBE ORDER (OUTPATIENT)
Dept: SCHEDULING | Age: 67
End: 2022-04-22

## 2022-04-22 DIAGNOSIS — R92.8 ABNORMAL MAMMOGRAM: Primary | ICD-10-CM

## 2022-04-28 ENCOUNTER — HOSPITAL ENCOUNTER (OUTPATIENT)
Dept: MAMMOGRAPHY | Age: 67
Discharge: HOME | End: 2022-04-28
Attending: INTERNAL MEDICINE
Payer: MEDICARE

## 2022-04-28 DIAGNOSIS — R92.8 ABNORMAL MAMMOGRAM: ICD-10-CM

## 2022-04-28 PROCEDURE — 77030019952

## 2022-04-28 PROCEDURE — 77065 DX MAMMO INCL CAD UNI: CPT

## 2022-04-28 PROCEDURE — 88305 TISSUE EXAM BY PATHOLOGIST: CPT

## 2022-04-28 RX ORDER — LIDOCAINE HYDROCHLORIDE 10 MG/ML
3 INJECTION INFILTRATION; PERINEURAL
Status: DISCONTINUED | OUTPATIENT
Start: 2022-04-28 | End: 2022-04-29 | Stop reason: HOSPADM

## 2022-04-28 RX ORDER — LIDOCAINE HYDROCHLORIDE AND EPINEPHRINE 10; 10 MG/ML; UG/ML
18 INJECTION, SOLUTION INFILTRATION; PERINEURAL
Status: DISCONTINUED | OUTPATIENT
Start: 2022-04-28 | End: 2022-04-29 | Stop reason: HOSPADM

## 2022-07-14 LAB
ALBUMIN SERPL-MCNC: 4.3 G/DL (ref 3.8–4.8)
ALBUMIN/GLOB SERPL: 1.4 {RATIO} (ref 1.2–2.2)
ALP SERPL-CCNC: 99 IU/L (ref 44–121)
ALT SERPL-CCNC: 6 IU/L (ref 0–32)
AST SERPL-CCNC: 10 IU/L (ref 0–40)
BILIRUB SERPL-MCNC: <0.2 MG/DL (ref 0–1.2)
BUN SERPL-MCNC: 10 MG/DL (ref 8–27)
BUN/CREAT SERPL: 14 (ref 12–28)
CALCIUM SERPL-MCNC: 9.5 MG/DL (ref 8.7–10.3)
CHLORIDE SERPL-SCNC: 100 MMOL/L (ref 96–106)
CO2 SERPL-SCNC: 26 MMOL/L (ref 20–29)
CREAT SERPL-MCNC: 0.74 MG/DL (ref 0.57–1)
EGFR: 90 ML/MIN/1.73
GLOBULIN SER CALC-MCNC: 3 G/DL (ref 1.5–4.5)
GLUCOSE SERPL-MCNC: 102 MG/DL (ref 65–99)
POTASSIUM SERPL-SCNC: 4 MMOL/L (ref 3.5–5.2)
PROT SERPL-MCNC: 7.3 G/DL (ref 6–8.5)
SODIUM SERPL-SCNC: 143 MMOL/L (ref 134–144)
T4 FREE SERPL-MCNC: 1.07 NG/DL (ref 0.82–1.77)
TSH SERPL DL<=0.005 MIU/L-ACNC: 0.78 UIU/ML (ref 0.45–4.5)

## 2022-07-20 ENCOUNTER — OFFICE VISIT (OUTPATIENT)
Dept: ENDOCRINOLOGY | Age: 67
End: 2022-07-20
Payer: MEDICARE

## 2022-07-20 VITALS
DIASTOLIC BLOOD PRESSURE: 68 MMHG | WEIGHT: 178.6 LBS | RESPIRATION RATE: 18 BRPM | HEIGHT: 67 IN | TEMPERATURE: 98.5 F | OXYGEN SATURATION: 96 % | HEART RATE: 83 BPM | SYSTOLIC BLOOD PRESSURE: 128 MMHG | BODY MASS INDEX: 28.03 KG/M2

## 2022-07-20 DIAGNOSIS — E05.20 TOXIC NODULAR GOITER: Primary | ICD-10-CM

## 2022-07-20 PROCEDURE — 3017F COLORECTAL CA SCREEN DOC REV: CPT | Performed by: INTERNAL MEDICINE

## 2022-07-20 PROCEDURE — G8400 PT W/DXA NO RESULTS DOC: HCPCS | Performed by: INTERNAL MEDICINE

## 2022-07-20 PROCEDURE — 1090F PRES/ABSN URINE INCON ASSESS: CPT | Performed by: INTERNAL MEDICINE

## 2022-07-20 PROCEDURE — G8510 SCR DEP NEG, NO PLAN REQD: HCPCS | Performed by: INTERNAL MEDICINE

## 2022-07-20 PROCEDURE — G8417 CALC BMI ABV UP PARAM F/U: HCPCS | Performed by: INTERNAL MEDICINE

## 2022-07-20 PROCEDURE — 1101F PT FALLS ASSESS-DOCD LE1/YR: CPT | Performed by: INTERNAL MEDICINE

## 2022-07-20 PROCEDURE — G8427 DOCREV CUR MEDS BY ELIG CLIN: HCPCS | Performed by: INTERNAL MEDICINE

## 2022-07-20 PROCEDURE — 1123F ACP DISCUSS/DSCN MKR DOCD: CPT | Performed by: INTERNAL MEDICINE

## 2022-07-20 PROCEDURE — G8536 NO DOC ELDER MAL SCRN: HCPCS | Performed by: INTERNAL MEDICINE

## 2022-07-20 PROCEDURE — 99214 OFFICE O/P EST MOD 30 MIN: CPT | Performed by: INTERNAL MEDICINE

## 2022-07-20 PROCEDURE — G9899 SCRN MAM PERF RSLTS DOC: HCPCS | Performed by: INTERNAL MEDICINE

## 2022-07-20 RX ORDER — METHIMAZOLE 5 MG/1
TABLET ORAL
Qty: 45 TABLET | Refills: 3 | Status: SHIPPED | OUTPATIENT
Start: 2022-07-20

## 2022-07-20 NOTE — LETTER
7/22/2022    Patient: Fidelia Umaña   YOB: 1955   Date of Visit: 7/20/2022     Ja Galeana MD  [facility] [address] [address]  Via Fax: [phone]    Dear Ja Galeana MD,      Thank you for referring Ms. Carolin Sutton to [address] [place] [address] for evaluation. My notes for this consultation are attached. If you have questions, please do not hesitate to call me. I look forward to following your patient along with you.       Sincerely,    Shawnee Lopez MD

## 2022-07-20 NOTE — PROGRESS NOTES
HISTORY OF PRESENT ILLNESS  Queenie Jones is a 74 y.o. female. HPI  6 monthly   follow up  after last  visit for low TSH and MNG  from   jan 2022       Lost 7 lbs   She had bilateral   mastectomies   for ductal carcinoma in the interim   No compressive symptoms         Jan 2022     Gained 5 lbs   C/o palpitations   She denies any compressive symptoms         Prior history :   Referred for evaluation of Low TSH   No family history of thyroid disease   Denies any hyper or hypothyroid symptoms   Bilateral breast cancer - s/p  Lumpectomy 2008 and 2014   No anti-estrogen  hormonal treatments followed up   F/u with Dr. Mine Araujo   Has alopecia and lost libido  She had hysterectomy and TLSO 2005         Review of Systems   Cardiovascular:  no  Psychiatric/Behavioral: Negative for depression and memory loss. The patient does not have insomnia. Physical Exam   Constitutional: oriented to person, place, and time. appears well-developed and well-nourished. Neck: Normal range of motion. Thyromegaly, right side nodule FELT HARD   Psychiatric: He has a normal mood and affect. Labs :  Lab Results   Component Value Date/Time    TSH 0.784 07/13/2022 12:00 AM    T4, Free 1.07 07/13/2022 12:00 AM      ASSESSMENT and PLAN    1. Low TSH  :  / hyperthyroidism   MNG, toxic  Or from warm  thyroid nodule . For benefit of doubt as she started noticing palpitation, initiated on tapazole low dose , despite which she continues to have palpitations   July 2022   : EUTHYROID  ON TAPAZOLE 5 MG every other day       2.   MULTINODULAR  goiter :   WITH WARM NODULE ON LEFT SIDE  Ordered follow up usg this year 2022  Along with CT neck with and without contrast   Will decide on repeat FNA on right side  , reminded her to get them done now   MAY 2021 -  LEFT SIDE  - NOT PERFORMED THE FNA  AS IT SEEMED TO HAVE SHRUNK IN SIZE PLUS IT WAS THE WARM NODULE ;    Reviewed the path report from date 18 November 2020 from [facility], it was non diagnostic     Jan 2020  : thyroid Scan - overall  Has 10 % uptake value and warm nodule on left lobe   S/p FNA in feb 2016 : both sites ( Isthmic and left lobe )  negative for cancer       2. HTN :  Better controlled  On atacand- hctz   16-12.5 mg a day       3. Bilateral breast cancer - s/p  Lumpectomy 2008 and 2014   She ended chemo radiation 2015 ; No anti-estrogen  hormonal treatments followed up   Now , she had bilateral mastectomies - 2022   She has alopecia and less libido from being post menopausal     4.   H/o adenocarcinoma of  Lung  - resected       F/u in 6 months        Reviewed results with patient and discussed the labs being ordered today/bnv  Patient voiced understanding of plan of care

## 2022-07-20 NOTE — PROGRESS NOTES
1. Have you been to the ER, urgent care clinic since your last visit? Breast Surgery/June 2022 Hospitalized since your last visit? No    2. Have you seen or consulted any other health care providers outside of the [address] [place] [address] since your last visit? Include any pap smears or colon screening.  No      Wt Readings from Last 3 Encounters:   07/20/22 178 lb 9.6 oz (81 kg)   01/18/22 185 lb (83.9 kg)   05/18/21 181 lb 6.4 oz (82.3 kg)     Temp Readings from Last 3 Encounters:   07/20/22 98.5 °F (36.9 °C) (Temporal)   01/18/22 98.6 °F (37 °C) (Temporal)   11/18/20 97.5 °F (36.4 °C) (Oral)     BP Readings from Last 3 Encounters:   07/20/22 128/68   01/18/22 135/72   05/18/21 136/75     Pulse Readings from Last 3 Encounters:   07/20/22 83   01/18/22 84   05/18/21 74

## 2022-07-20 NOTE — PATIENT INSTRUCTIONS
SPECIFIC INSTRUCTIONS BELOW      tapazole 5 mg every other day           -------------PAY ATTENTION TO THESE GENERAL INSTRUCTIONS -----------------      - The medications prescribed at this visit will not be available at pharmacy until 6 pm       - YOUR MED LIST IS NOT UP TO DATE AS SOME CHANGES ARE BEING MADE AFTER THE VISIT - FOLLOW SPECIFIC INSTRUCTIONS  ABOVE     -ANY tests other than blood work, which you opt to do  outside the  [facility] facilities, you are responsible for prior authorizations if  required    - [address] [facility]- PLEASE IGNORE     Results     *Normal results will not be notified by a phone call starting January 1 2021   *If you have an upcoming visit, the results will be discussed at the visit   *Please sign up for MY CHART if you want access to your lab and test results  *Abnormal results which require immediate attention will be notified by phone call   *Abnormal results which do not require immediate assistance will be notified in 1-2 weeks       Refills    -    have your pharmacy send us a refill request . Refills are done max for one year and a visit is a must before refills are extended    Follow up appointments -  highly encourage you to make it when you are checking out. We can accommodate you into the schedule based on your clinical situation, but not for extending refills beyond a year. Labs are important to give refills and is important to get labs before the visit     Phone calls  -  Allow  24 hrs.  for non-urgent calls to be returned  Prior authorization - It may take 2-4 weeks to process  Forms  -  FMLA, DMV etc., will take up to 2 weeks to process  Cancellations - please notify the office 2 days in advance   Samples  - will only be dispensed at visits       If not showing for the appointments and cancelling appointments within 24 hours are kept track of and three  of such situations in  two consecutive years will likely be considered for termination from the practice    -------------------------------------------------------------------------------------------------------------------

## 2022-08-09 ENCOUNTER — TRANSCRIBE ORDER (OUTPATIENT)
Dept: SCHEDULING | Age: 67
End: 2022-08-09

## 2022-08-09 DIAGNOSIS — C50.311 MALIGNANT NEOPLASM OF LOWER-INNER QUADRANT OF RIGHT FEMALE BREAST (HCC): Primary | ICD-10-CM

## 2022-08-26 ENCOUNTER — HOSPITAL ENCOUNTER (OUTPATIENT)
Dept: ULTRASOUND IMAGING | Age: 67
Discharge: HOME | End: 2022-08-26
Attending: INTERNAL MEDICINE
Payer: MEDICARE

## 2022-08-26 ENCOUNTER — HOSPITAL ENCOUNTER (OUTPATIENT)
Dept: CT IMAGING | Age: 67
Discharge: HOME | End: 2022-08-26
Attending: INTERNAL MEDICINE
Payer: MEDICARE

## 2022-08-26 DIAGNOSIS — C50.311 MALIGNANT NEOPLASM OF LOWER-INNER QUADRANT OF RIGHT FEMALE BREAST (HCC): ICD-10-CM

## 2022-08-26 DIAGNOSIS — E04.2 MULTINODULAR GOITER: ICD-10-CM

## 2022-08-26 PROCEDURE — 71260 CT THORAX DX C+: CPT

## 2022-08-26 PROCEDURE — 76536 US EXAM OF HEAD AND NECK: CPT

## 2022-08-26 PROCEDURE — 74011000636 HC RX REV CODE- 636: Performed by: INTERNAL MEDICINE

## 2022-08-26 RX ADMIN — IOPAMIDOL 100 ML: 755 INJECTION, SOLUTION INTRAVENOUS at 11:26

## 2022-10-05 ENCOUNTER — HOSPITAL ENCOUNTER (EMERGENCY)
Age: 67
Discharge: HOME | End: 2022-10-05
Attending: EMERGENCY MEDICINE
Payer: MEDICARE

## 2022-10-05 VITALS
WEIGHT: 180 LBS | BODY MASS INDEX: 28.25 KG/M2 | RESPIRATION RATE: 16 BRPM | TEMPERATURE: 98.1 F | SYSTOLIC BLOOD PRESSURE: 164 MMHG | HEIGHT: 67 IN | DIASTOLIC BLOOD PRESSURE: 92 MMHG | HEART RATE: 98 BPM | OXYGEN SATURATION: 97 %

## 2022-10-05 DIAGNOSIS — J06.9 VIRAL UPPER RESPIRATORY TRACT INFECTION: Primary | ICD-10-CM

## 2022-10-05 DIAGNOSIS — J02.9 PHARYNGITIS, UNSPECIFIED ETIOLOGY: ICD-10-CM

## 2022-10-05 DIAGNOSIS — I10 HYPERTENSION, UNSPECIFIED TYPE: ICD-10-CM

## 2022-10-05 LAB
DEPRECATED S PYO AG THROAT QL EIA: NEGATIVE
FLUAV AG NPH QL IA: NEGATIVE
FLUBV AG NOSE QL IA: NEGATIVE

## 2022-10-05 PROCEDURE — 99283 EMERGENCY DEPT VISIT LOW MDM: CPT

## 2022-10-05 PROCEDURE — 87804 INFLUENZA ASSAY W/OPTIC: CPT

## 2022-10-05 PROCEDURE — 87880 STREP A ASSAY W/OPTIC: CPT

## 2022-10-05 PROCEDURE — 87070 CULTURE OTHR SPECIMN AEROBIC: CPT

## 2022-10-05 RX ORDER — IBUPROFEN 400 MG/1
400 TABLET ORAL
Qty: 20 TABLET | Refills: 0 | Status: SHIPPED | OUTPATIENT
Start: 2022-10-05 | End: 2022-10-05 | Stop reason: SDUPTHER

## 2022-10-05 RX ORDER — LORATADINE 10 MG/1
10 TABLET ORAL DAILY
Qty: 10 TABLET | Refills: 0 | Status: SHIPPED | OUTPATIENT
Start: 2022-10-05 | End: 2022-10-15

## 2022-10-05 RX ORDER — LORATADINE 10 MG/1
10 TABLET ORAL DAILY
Qty: 10 TABLET | Refills: 0 | Status: SHIPPED | OUTPATIENT
Start: 2022-10-05 | End: 2022-10-05 | Stop reason: SDUPTHER

## 2022-10-05 RX ORDER — IBUPROFEN 400 MG/1
400 TABLET ORAL
Qty: 20 TABLET | Refills: 0 | Status: SHIPPED | OUTPATIENT
Start: 2022-10-05

## 2022-10-05 NOTE — ED PROVIDER NOTES
EMERGENCY DEPARTMENT HISTORY AND PHYSICAL EXAM      Date: 10/5/2022  Patient Name: Marilyne Gitelman    History of Presenting Illness     Chief Complaint   Patient presents with    Sore Throat     Pt reports sore throat for 1 week with no fever. Pt states left throat pain worse and radiates to left ear. Hx of exposure to spouse whom was dx with URI 10 days ago. Pt states negative covid test when spouse was tested. Ear Pain       History Provided By: Patient    HPI: Marilyne Gitelman, 74 y.o. female PMHx HTN, hyperthyroidism presents with sore throat 1 week. No fever, chills, NVD. No ageusia or anosmia. Has used nothing for symptoms. She is CoVID vaccinated. No cough, SOB or chest pain. There are no other complaints, changes, or physical findings at this time. PCP: Estrellita Andrade MD    No current facility-administered medications on file prior to encounter. Current Outpatient Medications on File Prior to Encounter   Medication Sig Dispense Refill    methIMAzole (TAPAZOLE) 5 mg tablet Take one tablet every other day 45 Tablet 3    candesartan-hydroCHLOROthiazide (ATACAND HCT) 16-12.5 mg per tablet Take 1 Tab by mouth daily. calcium 500 mg tab Take 1 Tab by mouth daily. Past History     Past Medical History:  Past Medical History:   Diagnosis Date    Bilateral breast cancer ([address] 2014, 2008    lumpectomy    HTN (hypertension)        Past Surgical History:  Past Surgical History:   Procedure Laterality Date    HX BREAST LUMPECTOMY Right     2009? HX BREAST LUMPECTOMY Left     2014?        Family History:  Family History   Problem Relation Age of Onset    Cancer Mother     Stroke Maternal Uncle     Heart Disease Maternal Grandmother        Social History:  Social History     Tobacco Use    Smoking status: Never    Smokeless tobacco: Never   Vaping Use    Vaping Use: Never used   Substance Use Topics    Alcohol use: No     Alcohol/week: 0.0 standard drinks    Drug use: No       Allergies:  No Known Allergies    Review of Systems   Review of Systems   Constitutional: Negative. HENT:  Positive for sore throat. Respiratory: Negative. Gastrointestinal: Negative. Genitourinary: Negative. Musculoskeletal: Negative. Neurological: Negative. All other systems reviewed and are negative. Physical Exam   Physical Exam  Vitals and nursing note reviewed. Constitutional:       Appearance: She is well-developed. HENT:      Head: Normocephalic. Nose: No congestion or rhinorrhea. Mouth/Throat:      Mouth: Mucous membranes are moist.      Pharynx: No pharyngeal swelling, oropharyngeal exudate or posterior oropharyngeal erythema. Tonsils: No tonsillar exudate or tonsillar abscesses. Eyes:      Conjunctiva/sclera: Conjunctivae normal.      Pupils: Pupils are equal, round, and reactive to light. Cardiovascular:      Rate and Rhythm: Normal rate and regular rhythm. Musculoskeletal:      Cervical back: Normal range of motion and neck supple. Skin:     General: Skin is warm. Neurological:      General: No focal deficit present. Mental Status: She is alert and oriented to person, place, and time. Lab and Diagnostic Study Results   Labs -     Recent Results (from the past 12 hour(s))   STREP AG SCREEN, GROUP A    Collection Time: 10/05/22 12:46 PM    Specimen: Serum; Throat   Result Value Ref Range    Group A Strep Ag ID Negative     INFLUENZA A & B AG (RAPID TEST)    Collection Time: 10/05/22 12:46 PM   Result Value Ref Range    Influenza A Antigen Negative Negative      Influenza B Antigen Negative Negative         Radiologic Studies -   @lastxrresult@  CT Results  (Last 48 hours)      None          CXR Results  (Last 48 hours)      None            Medical Decision Making and ED Course   Differential Diagnosis & Medical Decision Making Provider Note:       - I am the first provider for this patient.   I reviewed the vital signs, available nursing notes, past medical history, past surgical history, family history and social history. The patients presenting problems have been discussed, and they are in agreement with the care plan formulated and outlined with them. I have encouraged them to ask questions as they arise throughout their visit. Vital Signs-Reviewed the patient's vital signs. Patient Vitals for the past 12 hrs:   Temp Pulse Resp BP SpO2   10/05/22 1234 98.1 °F (36.7 °C) 98 16 (!) 164/92 97 %       ED Course:        HYPERTENSION COUNSELING: Education was provided to the patient today regarding their hypertension. Patient is made aware of their elevated blood pressure and is instructed to follow up this week with their Primary Care for a recheck. Patient is counseled regarding consequences of chronic, uncontrolled hypertension including kidney disease, heart disease, stroke or even death. Patient states their understanding and agrees to follow up this week. Additionally, during their visit, I discussed sodium restriction, maintaining ideal body weight and regular exercise program as physiologic means to achieve blood pressure control. The patient will strive towards this. Procedures   Performed by: Naveed Ford MD  Procedures      Disposition   Disposition: Condition stable and improved  DC- Adult Discharges: All of the diagnostic tests were reviewed and questions answered. Diagnosis, care plan and treatment options were discussed. The patient understands the instructions and will follow up as directed. The patients results have been reviewed with them. They have been counseled regarding their diagnosis. The patient verbally convey understanding and agreement of the signs, symptoms, diagnosis, treatment and prognosis and additionally agrees to follow up as recommended with their PCP in 24 - 48 hours. They also agree with the care-plan and convey that all of their questions have been answered.   I have also put together some discharge instructions for them that include: 1) educational information regarding their diagnosis, 2) how to care for their diagnosis at home, as well a 3) list of reasons why they would want to return to the ED prior to their follow-up appointment, should their condition change. DISCHARGE PLAN:  1. Current Discharge Medication List        CONTINUE these medications which have NOT CHANGED    Details   methIMAzole (TAPAZOLE) 5 mg tablet Take one tablet every other day  Qty: 45 Tablet, Refills: 3      candesartan-hydroCHLOROthiazide (ATACAND HCT) 16-12.5 mg per tablet Take 1 Tab by mouth daily. calcium 500 mg tab Take 1 Tab by mouth daily. 2.   Follow-up Information       Follow up With Specialties Details Why Contact Info    Annia Castro MD Family Medicine In 3 days  [place] [address]  [address] [facility] [id number] [phone]            3. Return to ED if worse   4. Current Discharge Medication List        START taking these medications    Details   ibuprofen (MOTRIN) 400 mg tablet Take 1 Tablet by mouth every six (6) hours as needed for Pain. Qty: 20 Tablet, Refills: 0  Start date: 10/5/2022      loratadine (Claritin) 10 mg tablet Take 1 Tablet by mouth daily for 10 days. Qty: 10 Tablet, Refills: 0  Start date: 10/5/2022, End date: 10/15/2022            Remove if admitted/transferred    Diagnosis/Clinical Impression     Clinical Impression:   1. Viral upper respiratory tract infection    2. Pharyngitis, unspecified etiology    3. Hypertension, unspecified type        Attestations: Lindsay Peterson MD, am the primary clinician of record. Please note that this dictation was completed with [employer], the [employer] voice recognition software. Quite often unanticipated grammatical, syntax, homophones, and other interpretive errors are inadvertently transcribed by the computer software. Please disregard these errors. Please excuse any errors that have escaped final proofreading. Thank you.

## 2022-10-06 LAB
BACTERIA SPEC CULT: NORMAL
SPECIAL REQUESTS,SREQ: NORMAL

## 2022-10-19 ENCOUNTER — OFFICE VISIT (OUTPATIENT)
Dept: ENDOCRINOLOGY | Age: 67
End: 2022-10-19
Payer: MEDICARE

## 2022-10-19 VITALS
WEIGHT: 177 LBS | HEART RATE: 84 BPM | HEIGHT: 67 IN | DIASTOLIC BLOOD PRESSURE: 72 MMHG | TEMPERATURE: 98.1 F | RESPIRATION RATE: 16 BRPM | SYSTOLIC BLOOD PRESSURE: 140 MMHG | OXYGEN SATURATION: 97 % | BODY MASS INDEX: 27.78 KG/M2

## 2022-10-19 DIAGNOSIS — E04.2 MULTINODULAR GOITER: Primary | ICD-10-CM

## 2022-10-19 PROCEDURE — 10005 FNA BX W/US GDN 1ST LES: CPT | Performed by: INTERNAL MEDICINE

## 2022-10-19 RX ORDER — BISMUTH SUBSALICYLATE 262 MG
1 TABLET,CHEWABLE ORAL DAILY
COMMUNITY

## 2022-10-19 NOTE — PROGRESS NOTES
Laurel Lopez is a 74 y.o. female here for   Chief Complaint   Patient presents with    Thyroid Problem    Biopsy       1. Have you been to the ER, urgent care clinic since your last visit? Hospitalized since your last visit? -no    2. Have you seen or consulted any other health care providers outside of the [address] [place] [address] since your last visit?   Include any pap smears or colon screening.-no

## 2022-10-19 NOTE — PROGRESS NOTES
[facility] DIABETES & ENDOCRINOLOGY  OFFICE PROCEDURE PROGRESS NOTE        Chart reviewed for the following:   Doris Gilford, MD, have reviewed the History, Physical and updated the Allergic reactions for Janet [facility] [address] performed immediately prior to start of procedure:   Doris Gilford, MD, have performed the following reviews on Trisha jm Willingham prior to the start of the procedure:            * Patient was identified by name and date of birth   * Agreement on procedure being performed was verified  * Risks and Benefits explained to the patient  * Procedure site verified and marked as necessary  * Patient was positioned for comfort  * Consent was signed and verified     Time: 1 pm    Pain scale : 0    Date of procedure: 10/19/2022    Procedure performed by:  Tip Limon MD    Provider assisted by: Ms. Aleks Rodriguez LPN        Real time imaging was performed in both transverse and sagittal planes    Nodule size 1.4 cm, left and growing   Following informed consent, a procedural pause was held to confirm patiient identity and site of biopsy. After sterile preparation, FNA guidance was performed using direct ultrasound guidance to confirm accurate needle placement. 5 aspirations were made using 25 G needles  Samples were submitted to cytology  Patient  tolerated procedure well without complications  After care instructions provided.       Pain scale post procedure : 2

## 2022-10-19 NOTE — PATIENT INSTRUCTIONS
SPECIFIC INSTRUCTIONS BELOW     Please take tylenol 500 mg or Motrin 400 mg (2 pills of 200 mg dose) OTC,  if there is any biopsy site pain    You can go back to your normal routine immediately    If you notice any dime sized redness, at the biopsy site, it is normal and do not worry     If you have more symptoms and signs requiring emergency assistance,  call 911   or go to Emergency room           -------------Wayne 1958 -----------------      - The medications prescribed at this visit will not be available at pharmacy until 6 pm       - YOUR MED LIST IS NOT UP TO DATE AS SOME CHANGES ARE BEING MADE AFTER THE VISIT - [address] [place] [address]     -ANY tests other than blood work, which you opt to do  outside the  [facility] imaging facilities, you are responsible for prior authorizations if  required    - HEALTH MAINTENANCE IS NOT GOING TO BE UP TO DATE ON YOUR AVS- PLEASE IGNORE     Results     *Normal results will not be notified by a phone call starting January 1 2021   *If you have an upcoming visit, the results will be discussed at the visit   *Please sign up for MY CHART if you want access to your lab and test results  *Abnormal results which require immediate attention will be notified by phone call   *Abnormal results which do not require immediate assistance will be notified in 1-2 weeks       Refills    -    have your pharmacy send us a refill request . Refills are done max for one year and a visit is a must before refills are extended    Follow up appointments -  highly encourage you to make it when you are checking out. We can accommodate you into the schedule based on your clinical situation, but not for extending refills beyond a year. Labs are important to give refills and is important to get labs before the visit     Phone calls  -  Allow  24 hrs.  for non-urgent calls to be returned  Prior authorization - It may take 2-4 weeks to process  Forms  -  FMLA, DMV etc., will take up to 2 weeks to process  Cancellations - please notify the office 2 days in advance   Samples  - will only be dispensed at visits       If not showing for the appointments and cancelling appointments within 24 hours are kept track of and three  of such situations in  two consecutive years will likely be considered for termination from the practice    -------------------------------------------------------------------------------------------------------------------

## 2022-10-25 ENCOUNTER — DOCUMENTATION ONLY (OUTPATIENT)
Dept: ENDOCRINOLOGY | Age: 67
End: 2022-10-25

## 2022-10-25 NOTE — PROGRESS NOTES
Reviewed the [facility] results from oct 19 2022     Path is negative for cancer     Inform patient     Jr Burleson MD

## 2023-01-18 ENCOUNTER — OFFICE VISIT (OUTPATIENT)
Dept: ENDOCRINOLOGY | Age: 68
End: 2023-01-18
Payer: MEDICARE

## 2023-01-18 VITALS
DIASTOLIC BLOOD PRESSURE: 70 MMHG | BODY MASS INDEX: 27.97 KG/M2 | RESPIRATION RATE: 18 BRPM | WEIGHT: 178.2 LBS | HEART RATE: 66 BPM | TEMPERATURE: 97 F | HEIGHT: 67 IN | OXYGEN SATURATION: 99 % | SYSTOLIC BLOOD PRESSURE: 135 MMHG

## 2023-01-18 DIAGNOSIS — E04.2 MULTINODULAR GOITER: Primary | ICD-10-CM

## 2023-01-18 DIAGNOSIS — E05.20 TOXIC NODULAR GOITER: ICD-10-CM

## 2023-01-18 DIAGNOSIS — C50.911 BILATERAL MALIGNANT NEOPLASM OF BREAST IN FEMALE, UNSPECIFIED ESTROGEN RECEPTOR STATUS, UNSPECIFIED SITE OF BREAST (HCC): ICD-10-CM

## 2023-01-18 DIAGNOSIS — C50.912 BILATERAL MALIGNANT NEOPLASM OF BREAST IN FEMALE, UNSPECIFIED ESTROGEN RECEPTOR STATUS, UNSPECIFIED SITE OF BREAST (HCC): ICD-10-CM

## 2023-01-18 PROCEDURE — 3017F COLORECTAL CA SCREEN DOC REV: CPT | Performed by: INTERNAL MEDICINE

## 2023-01-18 PROCEDURE — 99214 OFFICE O/P EST MOD 30 MIN: CPT | Performed by: INTERNAL MEDICINE

## 2023-01-18 PROCEDURE — G8510 SCR DEP NEG, NO PLAN REQD: HCPCS | Performed by: INTERNAL MEDICINE

## 2023-01-18 PROCEDURE — 1123F ACP DISCUSS/DSCN MKR DOCD: CPT | Performed by: INTERNAL MEDICINE

## 2023-01-18 PROCEDURE — G8400 PT W/DXA NO RESULTS DOC: HCPCS | Performed by: INTERNAL MEDICINE

## 2023-01-18 PROCEDURE — 1101F PT FALLS ASSESS-DOCD LE1/YR: CPT | Performed by: INTERNAL MEDICINE

## 2023-01-18 PROCEDURE — G9899 SCRN MAM PERF RSLTS DOC: HCPCS | Performed by: INTERNAL MEDICINE

## 2023-01-18 PROCEDURE — G8427 DOCREV CUR MEDS BY ELIG CLIN: HCPCS | Performed by: INTERNAL MEDICINE

## 2023-01-18 PROCEDURE — G8417 CALC BMI ABV UP PARAM F/U: HCPCS | Performed by: INTERNAL MEDICINE

## 2023-01-18 PROCEDURE — G8536 NO DOC ELDER MAL SCRN: HCPCS | Performed by: INTERNAL MEDICINE

## 2023-01-18 PROCEDURE — 1090F PRES/ABSN URINE INCON ASSESS: CPT | Performed by: INTERNAL MEDICINE

## 2023-01-18 NOTE — PROGRESS NOTES
HISTORY OF PRESENT ILLNESS  Nikki Kingston is a 74 y.o. female. HPI  6 monthly   follow up  after last  visit for low TSH and MNG  from   July 2022         In the interim, she had thyrodi biopsy - negative by [facility] [place] [place]  No compressive symptoms  or  hyper or hypothyroid symptoms           July 2022     Lost 7 lbs   She had bilateral   mastectomies   for ductal carcinoma in the interim   No compressive symptoms         Jan 2022     Gained 5 lbs   C/o palpitations   She denies any compressive symptoms         Prior history :   Referred for evaluation of Low TSH   No family history of thyroid disease   Denies any hyper or hypothyroid symptoms   Bilateral breast cancer - s/p  Lumpectomy 2008 and 2014   No anti-estrogen  hormonal treatments followed up   F/u with Dr. Lam Terrance   Has alopecia and lost libido  She had hysterectomy and TLSO 2005         Review of Systems   Cardiovascular:  no  Psychiatric/Behavioral: Negative for depression and memory loss. The patient does not have insomnia. Physical Exam   Constitutional: oriented to person, place, and time. appears well-developed and well-nourished. Neck: Normal range of motion. Thyromegaly, right side nodule FELT HARD   Psychiatric: He has a normal mood and affect. Labs :  Lab Results   Component Value Date/Time    TSH 0.69 01/11/2023 09:27 AM    T4, Free 1.07 07/13/2022 12:00 AM      ASSESSMENT and PLAN    1. Low TSH  :  / hyperthyroidism   MNG, toxic  Or from warm  thyroid nodule . For benefit of doubt as she started noticing palpitation, initiated on tapazole low dose , despite which she continues to have palpitations   Jan 2023    : EUTHYROID  ON TAPAZOLE 5 MG every other day       2. MULTINODULAR  goiter :   WITH WARM NODULE ON LEFT SIDE  October 2022  : left side - negative for cancer by [facility] [place] [place]  Will do usg in a year     Aug 2022 : Dominant 4.0 x 2.3 x 3.7 cm nodule in the lower pole right gland is unchanged. 9 mm in the right isthmus is stable. 1.4 x 1.1 x 0.8 cm complex nodule in the left gland is slightly increased in size, previously measuring 1.0 x 1.0 x 0.8 cm. MAY 2021 -  LEFT SIDE  - NOT PERFORMED THE FNA  AS IT SEEMED TO HAVE SHRUNK IN SIZE PLUS IT WAS THE WARM NODULE ;    Reviewed the path report from date 18 November 2020 from [address] [place] [address], it was non diagnostic   Jan 2020  : thyroid Scan - overall  Has 10 % uptake value and warm nodule on left lobe   S/p FNA in feb 2016 : both sites ( Isthmic and left lobe )  negative for cancer     2. HTN :  Better controlled  On atacand- hctz   16-12.5 mg a day     3. Bilateral breast cancer - s/p  bilateral mastectomies - 2022 ;  followed by Dr. Danyell Hudson     4.   H/o adenocarcinoma of  Lung  - resected       F/u in 12    months        Reviewed results with patient and discussed the labs being ordered today/bnv  Patient voiced understanding of plan of care

## 2023-01-18 NOTE — LETTER
1/18/2023    Patient: Matt Crockett   YOB: 1955   Date of Visit: 1/18/2023     Ulises Orr MD  [place] [id number]  Via Fax: [phone]    Dear Ulises Orr MD,      Thank you for referring Ms. Gloria Hines to [address] [place] [address] for evaluation. My notes for this consultation are attached. If you have questions, please do not hesitate to call me. I look forward to following your patient along with you.       Sincerely,    Lisa Antunez MD

## 2023-01-18 NOTE — PATIENT INSTRUCTIONS
SPECIFIC INSTRUCTIONS BELOW        tapazole 5 mg every other day       -------------PAY ATTENTION TO THESE GENERAL INSTRUCTIONS -----------------      - The medications prescribed at this visit will not be available at pharmacy until 6 pm       - YOUR MED LIST IS NOT UP TO DATE AS SOME CHANGES ARE BEING MADE AFTER THE VISIT - FOLLOW SPECIFIC INSTRUCTIONS  ABOVE     -ANY tests other than blood work, which you opt to do  outside the  [facility] facilities, you are responsible for prior authorizations if  required    - [address] [facility]- PLEASE IGNORE     Results     *Normal results will not be notified by a phone call starting January 1 2021   *If you have an upcoming visit, the results will be discussed at the visit   *Please sign up for MY CHART if you want access to your lab and test results  *Abnormal results which require immediate attention will be notified by phone call   *Abnormal results which do not require immediate assistance will be notified in 1-2 weeks       Refills    -    have your pharmacy send us a refill request . Refills are done max for one year and a visit is a must before refills are extended    Follow up appointments -  highly encourage you to make it when you are checking out. We can accommodate you into the schedule based on your clinical situation, but not for extending refills beyond a year. Labs are important to give refills and is important to get labs before the visit     Phone calls  -  Allow  24 hrs.  for non-urgent calls to be returned  Prior authorization - It may take 2-4 weeks to process  Forms  -  FMLA, DMV etc., will take up to 2 weeks to process  Cancellations - please notify the office 2 days in advance   Samples  - will only be dispensed at visits       If not showing for the appointments and cancelling appointments within 24 hours are kept track of and three  of such situations in  two consecutive years will likely be considered for termination from the practice    -------------------------------------------------------------------------------------------------------------------

## 2023-01-18 NOTE — PROGRESS NOTES
Omaira Ibarra is a 74 y.o. female here for   Chief Complaint   Patient presents with    Thyroid Problem     Goiter       1. Have you been to the ER or an urgent care clinic since your last visit?  - no    2. Have you been hospitalized since your last visit? - yes Mastectomy Bilateral 11/2022    3. Have you seen or consulted any other health care providers outside of the [address] [place] [address] since your last visit?   Include any pap smears or colon screening.- no

## 2023-04-22 DIAGNOSIS — E05.20 TOXIC NODULAR GOITER: ICD-10-CM

## 2023-04-22 DIAGNOSIS — C50.912 BILATERAL MALIGNANT NEOPLASM OF BREAST IN FEMALE, UNSPECIFIED ESTROGEN RECEPTOR STATUS, UNSPECIFIED SITE OF BREAST (HCC): ICD-10-CM

## 2023-04-22 DIAGNOSIS — C50.911 BILATERAL MALIGNANT NEOPLASM OF BREAST IN FEMALE, UNSPECIFIED ESTROGEN RECEPTOR STATUS, UNSPECIFIED SITE OF BREAST (HCC): ICD-10-CM

## 2023-04-22 DIAGNOSIS — E04.2 MULTINODULAR GOITER: Primary | ICD-10-CM

## 2023-04-23 DIAGNOSIS — E05.20 TOXIC NODULAR GOITER: ICD-10-CM

## 2023-04-23 DIAGNOSIS — E04.2 MULTINODULAR GOITER: Primary | ICD-10-CM

## 2023-06-07 DIAGNOSIS — E05.20 THYROTOXICOSIS WITH TOXIC MULTINODULAR GOITER WITHOUT THYROTOXIC CRISIS OR STORM: ICD-10-CM

## 2023-06-07 DIAGNOSIS — E04.2 NONTOXIC MULTINODULAR GOITER: Primary | ICD-10-CM

## 2023-06-07 RX ORDER — METHIMAZOLE 5 MG/1
TABLET ORAL
Qty: 45 TABLET | Refills: 3 | Status: SHIPPED | OUTPATIENT
Start: 2023-06-07

## 2023-08-03 ENCOUNTER — TELEPHONE (OUTPATIENT)
Age: 68
End: 2023-08-03

## 2023-08-03 DIAGNOSIS — E05.20 THYROTOXICOSIS WITH TOXIC MULTINODULAR GOITER WITHOUT THYROTOXIC CRISIS OR STORM: ICD-10-CM

## 2023-08-03 DIAGNOSIS — E04.2 NONTOXIC MULTINODULAR GOITER: ICD-10-CM

## 2023-08-04 RX ORDER — METHIMAZOLE 5 MG/1
TABLET ORAL
Qty: 45 TABLET | Refills: 1 | Status: SHIPPED | OUTPATIENT
Start: 2023-08-04

## 2023-08-04 NOTE — TELEPHONE ENCOUNTER
Called pt and informed her Dr. Kay Stewart will send in a 6 mo supply. Confirmed pharmacy and added to chart.